# Patient Record
Sex: MALE | Race: WHITE | NOT HISPANIC OR LATINO | Employment: FULL TIME | URBAN - METROPOLITAN AREA
[De-identification: names, ages, dates, MRNs, and addresses within clinical notes are randomized per-mention and may not be internally consistent; named-entity substitution may affect disease eponyms.]

---

## 2018-05-17 ENCOUNTER — OFFICE VISIT (OUTPATIENT)
Dept: FAMILY MEDICINE CLINIC | Facility: CLINIC | Age: 38
End: 2018-05-17

## 2018-05-17 VITALS
BODY MASS INDEX: 32.19 KG/M2 | WEIGHT: 250.8 LBS | RESPIRATION RATE: 16 BRPM | DIASTOLIC BLOOD PRESSURE: 60 MMHG | HEART RATE: 80 BPM | SYSTOLIC BLOOD PRESSURE: 115 MMHG | HEIGHT: 74 IN | OXYGEN SATURATION: 100 % | TEMPERATURE: 97.6 F

## 2018-05-17 DIAGNOSIS — E78.2 MIXED HYPERLIPIDEMIA: Primary | ICD-10-CM

## 2018-05-17 PROCEDURE — 99213 OFFICE O/P EST LOW 20 MIN: CPT | Performed by: PHYSICIAN ASSISTANT

## 2018-05-17 RX ORDER — ROSUVASTATIN CALCIUM 40 MG/1
1 TABLET, COATED ORAL DAILY
COMMUNITY
Start: 2018-03-09 | End: 2021-06-14 | Stop reason: SDUPTHER

## 2018-05-17 RX ORDER — CANAGLIFLOZIN 100 MG/1
1 TABLET, FILM COATED ORAL DAILY
COMMUNITY
Start: 2018-04-26 | End: 2018-11-30 | Stop reason: ALTCHOICE

## 2018-05-17 RX ORDER — GLIPIZIDE 10 MG/1
2 TABLET, FILM COATED, EXTENDED RELEASE ORAL
COMMUNITY
Start: 2018-05-04 | End: 2020-11-02 | Stop reason: SDUPTHER

## 2018-05-17 RX ORDER — SERTRALINE HYDROCHLORIDE 100 MG/1
1 TABLET, FILM COATED ORAL DAILY
COMMUNITY
Start: 2018-05-13 | End: 2020-11-02

## 2018-05-17 RX ORDER — LURASIDONE HYDROCHLORIDE 40 MG/1
1 TABLET, FILM COATED ORAL DAILY
COMMUNITY
Start: 2018-05-13 | End: 2020-11-02

## 2018-05-17 RX ORDER — METFORMIN HYDROCHLORIDE 500 MG/1
2 TABLET, EXTENDED RELEASE ORAL 2 TIMES DAILY
COMMUNITY
Start: 2018-03-09 | End: 2020-11-13 | Stop reason: SDUPTHER

## 2018-05-17 RX ORDER — DULAGLUTIDE 1.5 MG/.5ML
INJECTION, SOLUTION SUBCUTANEOUS
COMMUNITY
Start: 2018-04-30 | End: 2020-10-08 | Stop reason: SDUPTHER

## 2018-05-17 RX ORDER — LISINOPRIL 20 MG/1
1 TABLET ORAL DAILY
COMMUNITY
Start: 2018-04-14 | End: 2020-10-12 | Stop reason: SDUPTHER

## 2018-05-18 NOTE — PROGRESS NOTES
Subjective   George Tolentino is a 38 y.o. male  Establish Care (Referred by Dr. Schneider (endo)) and Diabetes      History of Present Illness   patient is a new patient is a 38-year-old white male comes to get established he has a history of type 2 diabetes and hyperlipidemia comes in with no problems or complaints she's doing better since being seen byendocrinology when you get established with her practice.  Denies any new problems or complaints currently taking them, Glucotrol lisinopril metformin Crestor Zoloft and Trulicity.  Patient states she feels much better but thinks he needs a family doctor  The following portions of the patient's history were reviewed and updated as appropriate: allergies, current medications, past social history and problem list    Review of Systems   Constitutional: Negative for appetite change, diaphoresis, fatigue and unexpected weight change.   Eyes: Negative for visual disturbance.   Respiratory: Negative for cough, chest tightness and shortness of breath.    Cardiovascular: Negative for chest pain, palpitations and leg swelling.   Gastrointestinal: Negative for diarrhea, nausea and vomiting.   Endocrine: Negative for polydipsia, polyphagia and polyuria.   Skin: Negative for color change and rash.   Neurological: Negative for dizziness, syncope, weakness, light-headedness, numbness and headaches.       Objective     Vitals:    05/17/18 1105   BP: 115/60   Pulse: 80   Resp: 16   Temp: 97.6 °F (36.4 °C)   SpO2: 100%       Physical Exam   Constitutional: He appears well-developed and well-nourished.   Neck: Neck supple. No JVD present. No thyromegaly present.   Cardiovascular: Normal rate, regular rhythm, normal heart sounds, intact distal pulses and normal pulses.    No murmur heard.  Pulmonary/Chest: Effort normal and breath sounds normal. No respiratory distress.   Abdominal: Soft. Bowel sounds are normal. There is no hepatosplenomegaly. There is no tenderness.   Musculoskeletal: He  exhibits no edema.   Lymphadenopathy:     He has no cervical adenopathy.   Neurological: No sensory deficit.   Skin: Skin is warm and dry. He is not diaphoretic.   Nursing note and vitals reviewed.      Assessment/Plan     Diagnoses and all orders for this visit:    Mixed hyperlipidemia    Other orders  -     metFORMIN ER (GLUCOPHAGE-XR) 500 MG 24 hr tablet; Take 2 tablets by mouth 2 (Two) Times a Day.  -     glipiZIDE (GLUCOTROL XL) 10 MG 24 hr tablet; Take 2 tablets by mouth. Take two tablets in the morning  -     rosuvastatin (CRESTOR) 40 MG tablet; Take 1 tablet by mouth Daily.  -     LATUDA 40 MG tablet tablet; Take 1 tablet by mouth Daily.  -     INVOKANA 100 MG tablet; Take 1 tablet by mouth Daily.  -     sertraline (ZOLOFT) 100 MG tablet; Take 1 tablet by mouth Daily.  -     TRULICITY 1.5 MG/0.5ML solution pen-injector; Once weekly  -     lisinopril (PRINIVIL,ZESTRIL) 20 MG tablet; Take 1 tablet by mouth Daily.     follow-up in one month discussed with patient continued portions of diet and exercise low carb low calorie diet exercise 3-5 times per week continue appointment with endocrinology we will see him back in summer for full physical

## 2018-11-30 ENCOUNTER — OFFICE VISIT (OUTPATIENT)
Dept: FAMILY MEDICINE CLINIC | Facility: CLINIC | Age: 38
End: 2018-11-30

## 2018-11-30 VITALS
BODY MASS INDEX: 30.96 KG/M2 | DIASTOLIC BLOOD PRESSURE: 70 MMHG | OXYGEN SATURATION: 98 % | WEIGHT: 241.2 LBS | HEIGHT: 74 IN | TEMPERATURE: 98.5 F | HEART RATE: 70 BPM | SYSTOLIC BLOOD PRESSURE: 114 MMHG | RESPIRATION RATE: 14 BRPM

## 2018-11-30 DIAGNOSIS — J30.9 ALLERGIC RHINITIS, UNSPECIFIED SEASONALITY, UNSPECIFIED TRIGGER: Primary | ICD-10-CM

## 2018-11-30 PROCEDURE — 99213 OFFICE O/P EST LOW 20 MIN: CPT | Performed by: PHYSICIAN ASSISTANT

## 2018-11-30 RX ORDER — CANAGLIFLOZIN 300 MG/1
1 TABLET, FILM COATED ORAL DAILY
COMMUNITY
Start: 2018-11-19 | End: 2020-11-02

## 2018-11-30 RX ORDER — AZELASTINE 1 MG/ML
2 SPRAY, METERED NASAL 2 TIMES DAILY
Qty: 39 ML | Refills: 12 | Status: SHIPPED | OUTPATIENT
Start: 2018-11-30 | End: 2021-11-11

## 2018-11-30 NOTE — PROGRESS NOTES
Subjective   George Tolentino is a 38 y.o. male  Allergies (Sneezing, watery/itchy eyes, runny nose, PND (resulting in dry heaves) Treating with OTC meds for several months.)      Allergies   This is a recurrent problem. The current episode started 1 to 4 weeks ago. The problem occurs constantly. The problem has been gradually worsening. Associated symptoms include congestion, coughing, headaches, a sore throat and swollen glands. Pertinent negatives include no chills, fatigue, fever, neck pain, rash, visual change or vomiting. The symptoms are aggravated by coughing, exertion and sneezing. Treatments tried: otc allergy medication. The treatment provided mild relief.       The following portions of the patient's history were reviewed and updated as appropriate: allergies, current medications, past social history and problem list    Review of Systems   Constitutional: Negative for chills, fatigue and fever.   HENT: Positive for congestion, ear pain, postnasal drip, rhinorrhea, sinus pressure and sore throat.    Eyes: Positive for pain.   Respiratory: Positive for cough. Negative for shortness of breath.    Gastrointestinal: Negative for vomiting.   Musculoskeletal: Negative for neck pain.   Skin: Negative for rash.   Neurological: Positive for headaches. Negative for dizziness.   Hematological: Negative for adenopathy.       Objective     Vitals:    11/30/18 0836   BP: 114/70   Pulse: 70   Resp: 14   Temp: 98.5 °F (36.9 °C)   SpO2: 98%       Physical Exam   Constitutional: He appears well-developed and well-nourished. No distress.   HENT:   Head: Normocephalic and atraumatic.   Right Ear: External ear normal.   Left Ear: External ear normal.   Nose: Nose normal.   Mouth/Throat: Oropharynx is clear and moist.   Eyes: Conjunctivae are normal.   Cardiovascular: Normal rate, regular rhythm and normal heart sounds.   Pulmonary/Chest: Effort normal and breath sounds normal.   Skin: He is not diaphoretic.   Nursing note and  vitals reviewed.      Assessment/Plan     Diagnoses and all orders for this visit:    Allergic rhinitis, unspecified seasonality, unspecified trigger  -     azelastine (ASTELIN) 0.1 % nasal spray; 2 sprays into the nostril(s) as directed by provider 2 (Two) Times a Day. Use in each nostril as directed    Other orders  -     INVOKANA 300 MG tablet; Take 1 tablet by mouth Daily.    Depo-Medrol 60 mg IM stat    Follow-up if no better, patient was warned of increased blood sugars due to steroid shot his diet needs to be watched closely since taking steroids.

## 2019-01-25 ENCOUNTER — OFFICE VISIT (OUTPATIENT)
Dept: FAMILY MEDICINE CLINIC | Facility: CLINIC | Age: 39
End: 2019-01-25

## 2019-01-25 VITALS
SYSTOLIC BLOOD PRESSURE: 116 MMHG | BODY MASS INDEX: 30.7 KG/M2 | HEIGHT: 74 IN | DIASTOLIC BLOOD PRESSURE: 80 MMHG | WEIGHT: 239.2 LBS | HEART RATE: 70 BPM | OXYGEN SATURATION: 99 % | RESPIRATION RATE: 16 BRPM

## 2019-01-25 DIAGNOSIS — R11.2 NAUSEA AND VOMITING, INTRACTABILITY OF VOMITING NOT SPECIFIED, UNSPECIFIED VOMITING TYPE: Primary | ICD-10-CM

## 2019-01-25 DIAGNOSIS — M79.671 FOOT PAIN, RIGHT: ICD-10-CM

## 2019-01-25 PROCEDURE — 99214 OFFICE O/P EST MOD 30 MIN: CPT | Performed by: PHYSICIAN ASSISTANT

## 2019-01-25 RX ORDER — PANTOPRAZOLE SODIUM 40 MG/1
40 TABLET, DELAYED RELEASE ORAL DAILY
Qty: 30 TABLET | Refills: 1 | Status: SHIPPED | OUTPATIENT
Start: 2019-01-25 | End: 2021-11-11

## 2019-01-25 NOTE — PROGRESS NOTES
Subjective   George Tolentino is a 38 y.o. male  Vomiting (Five episodes in two weeks. Only occurs in the morning approx one hour after waking up) and Foot Pain (Rt foot for several weeks. No known injury. )      History of Present Illness  Patient pleasant 8-year-old white male comes in complaining of nausea vomiting for last couple of weeks she states when he gets up the morning he still states his stomach he feels like he's having reflux symptoms he vomits first thing in morning that he feels fine this been going on for last 3 weeks he is a diabetic    Patient claims of chronic right foot pain for last couple of weeks patient's pain is worse with sitting standing for long period time when he gets out of bed in the morning pain is worse in his right foot denies any direct injury or trauma to right foot he's been taking Tylenol for pain  The following portions of the patient's history were reviewed and updated as appropriate: allergies, current medications, past social history and problem list    Review of Systems   Constitutional: Negative for appetite change, diaphoresis, fatigue and unexpected weight change.   Eyes: Negative for visual disturbance.   Respiratory: Negative for cough, chest tightness and shortness of breath.    Cardiovascular: Negative for chest pain, palpitations and leg swelling.   Gastrointestinal: Negative for diarrhea, nausea and vomiting.   Endocrine: Negative for polydipsia, polyphagia and polyuria.   Musculoskeletal:        Foot pain   Skin: Negative for color change and rash.   Neurological: Negative for dizziness, syncope, weakness, light-headedness, numbness and headaches.       Objective     Vitals:    01/25/19 0958   BP: 116/80   Pulse: 70   Resp: 16   SpO2: 99%       Physical Exam   Constitutional: He appears well-developed and well-nourished.   Neck: Neck supple. No JVD present. No thyromegaly present.   Cardiovascular: Normal rate, regular rhythm, normal heart sounds, intact distal  pulses and normal pulses.   No murmur heard.  Pulmonary/Chest: Effort normal and breath sounds normal. No respiratory distress.   Abdominal: Soft. Bowel sounds are normal. There is no hepatosplenomegaly. There is no tenderness.   Musculoskeletal: He exhibits no edema.        Lymphadenopathy:     He has no cervical adenopathy.   Neurological: No sensory deficit.   Skin: Skin is warm and dry. He is not diaphoretic.   Nursing note and vitals reviewed.      Assessment/Plan     Diagnoses and all orders for this visit:    Nausea and vomiting, intractability of vomiting not specified, unspecified vomiting type  -     pantoprazole (PROTONIX) 40 MG EC tablet; Take 1 tablet by mouth Daily.    Foot pain, right    Patient advised take over-the-counter Tylenol, range of motion exercise given for right foot, discussed ways to prevent and help plantar fasciitis.    Follow-up in 2 weeks

## 2020-01-14 ENCOUNTER — HOSPITAL ENCOUNTER (EMERGENCY)
Facility: HOSPITAL | Age: 40
Discharge: HOME | End: 2020-01-14
Attending: EMERGENCY MEDICINE | Admitting: EMERGENCY MEDICINE

## 2020-01-14 ENCOUNTER — APPOINTMENT (OUTPATIENT)
Dept: CT IMAGING | Facility: HOSPITAL | Age: 40
End: 2020-01-14

## 2020-01-14 VITALS
HEART RATE: 76 BPM | SYSTOLIC BLOOD PRESSURE: 105 MMHG | WEIGHT: 250 LBS | HEIGHT: 74 IN | RESPIRATION RATE: 18 BRPM | DIASTOLIC BLOOD PRESSURE: 63 MMHG | BODY MASS INDEX: 32.08 KG/M2 | OXYGEN SATURATION: 96 % | TEMPERATURE: 98.5 F

## 2020-01-14 DIAGNOSIS — E86.0 DEHYDRATION: ICD-10-CM

## 2020-01-14 DIAGNOSIS — R11.2 NAUSEA VOMITING AND DIARRHEA: ICD-10-CM

## 2020-01-14 DIAGNOSIS — K52.9 GASTROENTERITIS: Primary | ICD-10-CM

## 2020-01-14 DIAGNOSIS — IMO0001 INSULIN DEPENDENT DIABETES MELLITUS: ICD-10-CM

## 2020-01-14 DIAGNOSIS — R19.7 NAUSEA VOMITING AND DIARRHEA: ICD-10-CM

## 2020-01-14 LAB
ALBUMIN SERPL-MCNC: 4.7 G/DL (ref 3.5–5.2)
ALBUMIN/GLOB SERPL: 1.7 G/DL
ALP SERPL-CCNC: 50 U/L (ref 39–117)
ALT SERPL W P-5'-P-CCNC: 33 U/L (ref 1–41)
ANION GAP SERPL CALCULATED.3IONS-SCNC: 12 MMOL/L (ref 5–15)
AST SERPL-CCNC: 19 U/L (ref 1–40)
B-OH-BUTYR SERPL-SCNC: 0.45 MMOL/L (ref 0.02–0.27)
BACTERIA UR QL AUTO: NORMAL /HPF
BASOPHILS # BLD AUTO: 0.03 10*3/MM3 (ref 0–0.2)
BASOPHILS NFR BLD AUTO: 0.6 % (ref 0–1.5)
BILIRUB SERPL-MCNC: 0.9 MG/DL (ref 0.2–1.2)
BILIRUB UR QL STRIP: NEGATIVE
BUN BLD-MCNC: 21 MG/DL (ref 6–20)
BUN/CREAT SERPL: 30 (ref 7–25)
CALCIUM SPEC-SCNC: 9.5 MG/DL (ref 8.6–10.5)
CHLORIDE SERPL-SCNC: 99 MMOL/L (ref 98–107)
CLARITY UR: CLEAR
CO2 SERPL-SCNC: 26 MMOL/L (ref 22–29)
COLOR UR: YELLOW
CREAT BLD-MCNC: 0.7 MG/DL (ref 0.76–1.27)
DEPRECATED RDW RBC AUTO: 40.3 FL (ref 37–54)
EOSINOPHIL # BLD AUTO: 0.02 10*3/MM3 (ref 0–0.4)
EOSINOPHIL NFR BLD AUTO: 0.4 % (ref 0.3–6.2)
ERYTHROCYTE [DISTWIDTH] IN BLOOD BY AUTOMATED COUNT: 12.3 % (ref 12.3–15.4)
GFR SERPL CREATININE-BSD FRML MDRD: 127 ML/MIN/1.73
GLOBULIN UR ELPH-MCNC: 2.8 GM/DL
GLUCOSE BLD-MCNC: 154 MG/DL (ref 65–99)
GLUCOSE UR STRIP-MCNC: ABNORMAL MG/DL
HCT VFR BLD AUTO: 46.7 % (ref 37.5–51)
HGB BLD-MCNC: 15.9 G/DL (ref 13–17.7)
HGB UR QL STRIP.AUTO: NEGATIVE
HOLD SPECIMEN: NORMAL
HOLD SPECIMEN: NORMAL
HYALINE CASTS UR QL AUTO: NORMAL /LPF
IMM GRANULOCYTES # BLD AUTO: 0.01 10*3/MM3 (ref 0–0.05)
IMM GRANULOCYTES NFR BLD AUTO: 0.2 % (ref 0–0.5)
KETONES UR QL STRIP: ABNORMAL
LEUKOCYTE ESTERASE UR QL STRIP.AUTO: NEGATIVE
LIPASE SERPL-CCNC: 15 U/L (ref 13–60)
LYMPHOCYTES # BLD AUTO: 0.77 10*3/MM3 (ref 0.7–3.1)
LYMPHOCYTES NFR BLD AUTO: 16 % (ref 19.6–45.3)
MCH RBC QN AUTO: 30.6 PG (ref 26.6–33)
MCHC RBC AUTO-ENTMCNC: 34 G/DL (ref 31.5–35.7)
MCV RBC AUTO: 89.8 FL (ref 79–97)
MONOCYTES # BLD AUTO: 0.57 10*3/MM3 (ref 0.1–0.9)
MONOCYTES NFR BLD AUTO: 11.8 % (ref 5–12)
NEUTROPHILS # BLD AUTO: 3.42 10*3/MM3 (ref 1.7–7)
NEUTROPHILS NFR BLD AUTO: 71 % (ref 42.7–76)
NITRITE UR QL STRIP: NEGATIVE
NRBC BLD AUTO-RTO: 0 /100 WBC (ref 0–0.2)
PH UR STRIP.AUTO: 5.5 [PH] (ref 5–8)
PLATELET # BLD AUTO: 162 10*3/MM3 (ref 140–450)
PMV BLD AUTO: 10.2 FL (ref 6–12)
POTASSIUM BLD-SCNC: 3.6 MMOL/L (ref 3.5–5.2)
PROT SERPL-MCNC: 7.5 G/DL (ref 6–8.5)
PROT UR QL STRIP: ABNORMAL
RBC # BLD AUTO: 5.2 10*6/MM3 (ref 4.14–5.8)
RBC # UR: NORMAL /HPF
REF LAB TEST METHOD: NORMAL
SODIUM BLD-SCNC: 137 MMOL/L (ref 136–145)
SP GR UR STRIP: 1.03 (ref 1–1.03)
SQUAMOUS #/AREA URNS HPF: NORMAL /HPF
UROBILINOGEN UR QL STRIP: ABNORMAL
WBC NRBC COR # BLD: 4.82 10*3/MM3 (ref 3.4–10.8)
WBC UR QL AUTO: NORMAL /HPF
WHOLE BLOOD HOLD SPECIMEN: NORMAL
WHOLE BLOOD HOLD SPECIMEN: NORMAL

## 2020-01-14 PROCEDURE — 96374 THER/PROPH/DIAG INJ IV PUSH: CPT

## 2020-01-14 PROCEDURE — 85025 COMPLETE CBC W/AUTO DIFF WBC: CPT | Performed by: EMERGENCY MEDICINE

## 2020-01-14 PROCEDURE — 74177 CT ABD & PELVIS W/CONTRAST: CPT

## 2020-01-14 PROCEDURE — 80053 COMPREHEN METABOLIC PANEL: CPT | Performed by: EMERGENCY MEDICINE

## 2020-01-14 PROCEDURE — 82010 KETONE BODYS QUAN: CPT | Performed by: PHYSICIAN ASSISTANT

## 2020-01-14 PROCEDURE — 81001 URINALYSIS AUTO W/SCOPE: CPT | Performed by: EMERGENCY MEDICINE

## 2020-01-14 PROCEDURE — 25010000002 ONDANSETRON PER 1 MG: Performed by: PHYSICIAN ASSISTANT

## 2020-01-14 PROCEDURE — 96375 TX/PRO/DX INJ NEW DRUG ADDON: CPT

## 2020-01-14 PROCEDURE — 83690 ASSAY OF LIPASE: CPT | Performed by: EMERGENCY MEDICINE

## 2020-01-14 PROCEDURE — 99283 EMERGENCY DEPT VISIT LOW MDM: CPT

## 2020-01-14 PROCEDURE — 96361 HYDRATE IV INFUSION ADD-ON: CPT

## 2020-01-14 PROCEDURE — 25010000002 IOPAMIDOL 61 % SOLUTION: Performed by: EMERGENCY MEDICINE

## 2020-01-14 RX ORDER — ONDANSETRON 2 MG/ML
4 INJECTION INTRAMUSCULAR; INTRAVENOUS ONCE
Status: COMPLETED | OUTPATIENT
Start: 2020-01-14 | End: 2020-01-14

## 2020-01-14 RX ORDER — FAMOTIDINE 10 MG/ML
20 INJECTION, SOLUTION INTRAVENOUS ONCE
Status: COMPLETED | OUTPATIENT
Start: 2020-01-14 | End: 2020-01-14

## 2020-01-14 RX ORDER — SODIUM CHLORIDE 0.9 % (FLUSH) 0.9 %
10 SYRINGE (ML) INJECTION AS NEEDED
Status: DISCONTINUED | OUTPATIENT
Start: 2020-01-14 | End: 2020-01-14 | Stop reason: HOSPADM

## 2020-01-14 RX ORDER — ONDANSETRON 4 MG/1
4 TABLET, ORALLY DISINTEGRATING ORAL EVERY 8 HOURS PRN
Qty: 30 TABLET | Refills: 0 | Status: SHIPPED | OUTPATIENT
Start: 2020-01-14 | End: 2021-11-11

## 2020-01-14 RX ADMIN — ONDANSETRON 4 MG: 2 INJECTION INTRAMUSCULAR; INTRAVENOUS at 11:55

## 2020-01-14 RX ADMIN — IOPAMIDOL 100 ML: 612 INJECTION, SOLUTION INTRAVENOUS at 12:55

## 2020-01-14 RX ADMIN — FAMOTIDINE 20 MG: 10 INJECTION, SOLUTION INTRAVENOUS at 11:55

## 2020-01-14 RX ADMIN — SODIUM CHLORIDE, POTASSIUM CHLORIDE, SODIUM LACTATE AND CALCIUM CHLORIDE 2466 ML: 600; 310; 30; 20 INJECTION, SOLUTION INTRAVENOUS at 11:53

## 2020-01-14 NOTE — ED PROVIDER NOTES
Subjective   39-year-old male with a history of type 2 insulin-dependent diabetes presents emergency department with a 2-day history of intractable nausea vomiting and diarrhea.  Vomiting TNTC, as well as watery diarrhea TNTC with mild stomach cramping, orthostasis fatigue anorexia and occasional chills and sweats.  He denies any recent ill contacts, no suspicious food ingestion, no recent antibiotics.  No constitutional symptoms such as documented fevers, myalgias or arthralgias.  No stiff neck vision changes or photophobia.  No sore throat rhinorrhea or cough.  He is an insulin-dependent type 2 diabetic, states his blood glucose levels have been slightly higher than usual.  Past medical history is further remarkable for GERD and hypertension as well as depression.  Medication list reviewed, he denies drug allergies.          Review of Systems   Constitutional: Positive for activity change and appetite change. Negative for chills, diaphoresis and fever.   HENT: Negative for congestion and sore throat.    Eyes: Negative for photophobia and visual disturbance.   Respiratory: Negative for cough, choking, chest tightness, shortness of breath and wheezing.    Cardiovascular: Negative for chest pain and leg swelling.   Gastrointestinal: Positive for diarrhea, nausea and vomiting. Negative for abdominal distention, abdominal pain, anal bleeding, blood in stool and constipation.   Genitourinary: Negative for difficulty urinating, dysuria, flank pain, frequency, hematuria and urgency.   Musculoskeletal: Negative for arthralgias, back pain, joint swelling and myalgias.   Neurological: Negative for dizziness, seizures, syncope, speech difficulty, weakness, light-headedness, numbness and headaches.   Psychiatric/Behavioral: Negative for confusion.   All other systems reviewed and are negative.      Past Medical History:   Diagnosis Date   • Depression    • Diabetes mellitus (CMS/[facility])    • GERD (gastroesophageal reflux disease)     • Hypertension        No Known Allergies    History reviewed. No pertinent surgical history.    Family History   Problem Relation Age of Onset   • Thyroid disease Mother    • Diabetes Father    • Hypertension Father    • Hyperlipidemia Father    • Liver disease Father    • Diabetes Maternal Grandmother    • Diabetes Maternal Grandfather    • Diabetes Paternal Grandmother    • Thyroid disease Paternal Grandmother    • Kidney disease Paternal Grandmother    • Cancer Paternal Grandfather    • Diabetes Paternal Grandfather    • Heart attack Paternal Grandfather    • Kidney disease Paternal Grandfather        Social History     Socioeconomic History   • Marital status:      Spouse name: Not on file   • Number of children: Not on file   • Years of education: Not on file   • Highest education level: Not on file   Tobacco Use   • Smoking status: Never Smoker   • Smokeless tobacco: Never Used   Substance and Sexual Activity   • Alcohol use: Yes     Comment: 10 drinks/week   • Drug use: No     Comment: Quit 5/16/18           Objective   Physical Exam   Constitutional: He is oriented to person, place, and time. He appears well-developed and well-nourished. No distress.   Afebrile awake alert and oriented, hemodynamically stable, not toxic appearing.   HENT:   Head: Normocephalic and atraumatic.   Right Ear: External ear normal.   Left Ear: External ear normal.   Nose: Nose normal.   Mouth/Throat: Oropharynx is clear and moist. No oropharyngeal exudate.   Eyes: Pupils are equal, round, and reactive to light. Conjunctivae and EOM are normal. Right eye exhibits no discharge. Left eye exhibits no discharge. No scleral icterus.   Neck: Normal range of motion. Neck supple. No JVD present. No tracheal deviation present. No thyromegaly present.   Cardiovascular: Normal rate, regular rhythm and normal heart sounds. Exam reveals no gallop and no friction rub.   No murmur heard.  Pulmonary/Chest: Effort normal and breath sounds  normal. No stridor. No respiratory distress. He has no wheezes. He exhibits no tenderness.   Abdominal: Soft. Bowel sounds are normal. He exhibits no distension and no mass. There is no tenderness. There is no rebound and no guarding.   Musculoskeletal: Normal range of motion. He exhibits no edema, tenderness or deformity.   Neurological: He is alert and oriented to person, place, and time. No cranial nerve deficit. He exhibits normal muscle tone. Coordination normal.   Skin: Skin is warm and dry. No rash noted. He is not diaphoretic. No erythema. No pallor.   Psychiatric: He has a normal mood and affect. His behavior is normal. Judgment and thought content normal.   Nursing note and vitals reviewed.      Procedures           ED Course  ED Course as of Jan 14 1654   Tue Jan 14, 2020   1110 Anion Gap: 12.0 [TG]   1110 BUN(!): 21 [TG]   1110 Creatinine(!): 0.70 [TG]   1110 Glucose(!): 154 [TG]   1111 Specific Gravity, UA(!): 1.034 [TG]   1111 Ketones, UA(!): 15 mg/dL (1+) [TG]   1111 WBC: 4.82 [TG]   1137 Beta-Hydroxybutyrate Quant(!): 0.449 [TG]      ED Course User Index  [TG] Qasim Ott PA-C                      [place] Coma Scale Score: 15                Recent Results (from the past 24 hour(s))   Comprehensive Metabolic Panel    Collection Time: 01/14/20 10:10 AM   Result Value Ref Range    Glucose 154 (H) 65 - 99 mg/dL    BUN 21 (H) 6 - 20 mg/dL    Creatinine 0.70 (L) 0.76 - 1.27 mg/dL    Sodium 137 136 - 145 mmol/L    Potassium 3.6 3.5 - 5.2 mmol/L    Chloride 99 98 - 107 mmol/L    CO2 26.0 22.0 - 29.0 mmol/L    Calcium 9.5 8.6 - 10.5 mg/dL    Total Protein 7.5 6.0 - 8.5 g/dL    Albumin 4.70 3.50 - 5.20 g/dL    ALT (SGPT) 33 1 - 41 U/L    AST (SGOT) 19 1 - 40 U/L    Alkaline Phosphatase 50 39 - 117 U/L    Total Bilirubin 0.9 0.2 - 1.2 mg/dL    eGFR Non African Amer 127 >60 mL/min/1.73    Globulin 2.8 gm/dL    A/G Ratio 1.7 g/dL    BUN/Creatinine Ratio 30.0 (H) 7.0 - 25.0    Anion Gap 12.0 5.0 - 15.0  mmol/L   Lipase    Collection Time: 01/14/20 10:10 AM   Result Value Ref Range    Lipase 15 13 - 60 U/L   Urinalysis With Microscopic If Indicated (No Culture) - Urine, Clean Catch    Collection Time: 01/14/20 10:10 AM   Result Value Ref Range    Color, UA Yellow Yellow, Straw    Appearance, UA Clear Clear    pH, UA 5.5 5.0 - 8.0    Specific Gravity, UA 1.034 (H) 1.001 - 1.030    Glucose, UA >=1000 mg/dL (3+) (A) Negative    Ketones, UA 15 mg/dL (1+) (A) Negative    Bilirubin, UA Negative Negative    Blood, UA Negative Negative    Protein,  mg/dL (2+) (A) Negative    Leuk Esterase, UA Negative Negative    Nitrite, UA Negative Negative    Urobilinogen, UA 1.0 E.U./dL 0.2 - 1.0 E.U./dL   Light Blue Top    Collection Time: 01/14/20 10:10 AM   Result Value Ref Range    Extra Tube hold for add-on    Green Top (Gel)    Collection Time: 01/14/20 10:10 AM   Result Value Ref Range    Extra Tube Hold for add-ons.    Lavender Top    Collection Time: 01/14/20 10:10 AM   Result Value Ref Range    Extra Tube hold for add-on    Gold Top - SST    Collection Time: 01/14/20 10:10 AM   Result Value Ref Range    Extra Tube Hold for add-ons.    CBC Auto Differential    Collection Time: 01/14/20 10:10 AM   Result Value Ref Range    WBC 4.82 3.40 - 10.80 10*3/mm3    RBC 5.20 4.14 - 5.80 10*6/mm3    Hemoglobin 15.9 13.0 - 17.7 g/dL    Hematocrit 46.7 37.5 - 51.0 %    MCV 89.8 79.0 - 97.0 fL    MCH 30.6 26.6 - 33.0 pg    MCHC 34.0 31.5 - 35.7 g/dL    RDW 12.3 12.3 - 15.4 %    RDW-SD 40.3 37.0 - 54.0 fl    MPV 10.2 6.0 - 12.0 fL    Platelets 162 140 - 450 10*3/mm3    Neutrophil % 71.0 42.7 - 76.0 %    Lymphocyte % 16.0 (L) 19.6 - 45.3 %    Monocyte % 11.8 5.0 - 12.0 %    Eosinophil % 0.4 0.3 - 6.2 %    Basophil % 0.6 0.0 - 1.5 %    Immature Grans % 0.2 0.0 - 0.5 %    Neutrophils, Absolute 3.42 1.70 - 7.00 10*3/mm3    Lymphocytes, Absolute 0.77 0.70 - 3.10 10*3/mm3    Monocytes, Absolute 0.57 0.10 - 0.90 10*3/mm3    Eosinophils,  Absolute 0.02 0.00 - 0.40 10*3/mm3    Basophils, Absolute 0.03 0.00 - 0.20 10*3/mm3    Immature Grans, Absolute 0.01 0.00 - 0.05 10*3/mm3    nRBC 0.0 0.0 - 0.2 /100 WBC   Urinalysis, Microscopic Only - Urine, Clean Catch    Collection Time: 01/14/20 10:10 AM   Result Value Ref Range    RBC, UA None Seen None Seen, 0-2 /HPF    WBC, UA 0-2 None Seen, 0-2 /HPF    Bacteria, UA None Seen None Seen, Trace /HPF    Squamous Epithelial Cells, UA 0-2 None Seen, 0-2 /HPF    Hyaline Casts, UA 0-6 0 - 6 /LPF    Methodology Automated Microscopy    Beta Hydroxybutyrate Quantitative    Collection Time: 01/14/20 10:10 AM   Result Value Ref Range    Beta-Hydroxybutyrate Quant 0.449 (H) 0.020 - 0.270 mmol/L     Note: In addition to lab results from this visit, the labs listed above may include labs taken at another facility or during a different encounter within the last 24 hours. Please correlate lab times with ED admission and discharge times for further clarification of the services performed during this visit.    CT Abdomen Pelvis With Contrast   Preliminary Result   1. Small bowel wall thickening and trace abdominal free fluid noted   favored to represent an enteritis. Correlate clinically.   2. Hepatic steatosis.       D:  01/14/2020   E:  01/14/2020            Vitals:    01/14/20 1315 01/14/20 1330 01/14/20 1345 01/14/20 1400   BP:    105/63   BP Location:       Patient Position:       Pulse:       Resp:       Temp:       TempSrc:       SpO2: 98% 97% 96% 96%   Weight:       Height:         Medications   sodium chloride 0.9 % flush 10 mL (has no administration in time range)   lactated ringers - IBW for BMI > 30 bolus 2,466 mL (2,466 mL Intravenous New Bag 1/14/20 1153)   ondansetron (ZOFRAN) injection 4 mg (4 mg Intravenous Given 1/14/20 1155)   famotidine (PEPCID) injection 20 mg (20 mg Intravenous Given 1/14/20 1155)   iopamidol (ISOVUE-300) 61 % injection 100 mL (100 mL Intravenous Given 1/14/20 1255)     ECG/EMG Results  (last 24 hours)     ** No results found for the last 24 hours. **        No orders to display               MDM    Final diagnoses:   Gastroenteritis   Nausea vomiting and diarrhea   Dehydration   Insulin dependent diabetes mellitus (CMS/[facility])            Qasim Ott PA-C  01/14/20 4149

## 2020-01-14 NOTE — DISCHARGE INSTRUCTIONS
Clear liquids only for the next 12 hours, then brat diet (broth, rice, applesauce, toast) for 24 hours.  No dairy or fried foods for 3 to 5 days.  Follow-up with your primary care provider in 2 to 3 days for recheck.  Return to the emergency department immediately if any change or worsening of symptoms.

## 2020-10-09 RX ORDER — DULAGLUTIDE 1.5 MG/.5ML
1.5 INJECTION, SOLUTION SUBCUTANEOUS WEEKLY
Qty: 12 PEN | Refills: 3 | Status: SHIPPED | OUTPATIENT
Start: 2020-10-09 | End: 2021-10-19

## 2020-10-12 RX ORDER — LISINOPRIL 20 MG/1
TABLET ORAL
Qty: 60 TABLET | Refills: 5 | Status: SHIPPED | OUTPATIENT
Start: 2020-10-12 | End: 2021-01-08 | Stop reason: SDUPTHER

## 2020-10-13 ENCOUNTER — PRIOR AUTHORIZATION (OUTPATIENT)
Dept: ENDOCRINOLOGY | Facility: CLINIC | Age: 40
End: 2020-10-13

## 2020-10-14 ENCOUNTER — TELEPHONE (OUTPATIENT)
Dept: ENDOCRINOLOGY | Facility: CLINIC | Age: 40
End: 2020-10-14

## 2020-11-02 ENCOUNTER — LAB (OUTPATIENT)
Dept: LAB | Facility: HOSPITAL | Age: 40
End: 2020-11-02

## 2020-11-02 ENCOUNTER — OFFICE VISIT (OUTPATIENT)
Dept: ENDOCRINOLOGY | Facility: CLINIC | Age: 40
End: 2020-11-02

## 2020-11-02 VITALS
TEMPERATURE: 97.8 F | WEIGHT: 254.8 LBS | HEIGHT: 74 IN | OXYGEN SATURATION: 98 % | DIASTOLIC BLOOD PRESSURE: 68 MMHG | SYSTOLIC BLOOD PRESSURE: 112 MMHG | BODY MASS INDEX: 32.7 KG/M2 | HEART RATE: 92 BPM

## 2020-11-02 DIAGNOSIS — R80.9 TYPE 2 DIABETES MELLITUS WITH MICROALBUMINURIA, WITHOUT LONG-TERM CURRENT USE OF INSULIN (HCC): ICD-10-CM

## 2020-11-02 DIAGNOSIS — E78.2 MIXED HYPERLIPIDEMIA: ICD-10-CM

## 2020-11-02 DIAGNOSIS — E11.65 UNCONTROLLED TYPE 2 DIABETES MELLITUS WITH HYPERGLYCEMIA (HCC): ICD-10-CM

## 2020-11-02 DIAGNOSIS — E11.29 TYPE 2 DIABETES MELLITUS WITH MICROALBUMINURIA, WITHOUT LONG-TERM CURRENT USE OF INSULIN (HCC): ICD-10-CM

## 2020-11-02 DIAGNOSIS — E11.65 UNCONTROLLED TYPE 2 DIABETES MELLITUS WITH HYPERGLYCEMIA (HCC): Primary | ICD-10-CM

## 2020-11-02 LAB
ALBUMIN SERPL-MCNC: 4.5 G/DL (ref 3.5–5.2)
ALBUMIN UR-MCNC: 38.4 MG/DL
ALBUMIN/GLOB SERPL: 1.8 G/DL
ALP SERPL-CCNC: 60 U/L (ref 39–117)
ALT SERPL W P-5'-P-CCNC: 56 U/L (ref 1–41)
ANION GAP SERPL CALCULATED.3IONS-SCNC: 8 MMOL/L (ref 5–15)
AST SERPL-CCNC: 29 U/L (ref 1–40)
BILIRUB SERPL-MCNC: 0.6 MG/DL (ref 0–1.2)
BUN SERPL-MCNC: 15 MG/DL (ref 6–20)
BUN/CREAT SERPL: 23.4 (ref 7–25)
CALCIUM SPEC-SCNC: 9.4 MG/DL (ref 8.6–10.5)
CHLORIDE SERPL-SCNC: 102 MMOL/L (ref 98–107)
CHOLEST SERPL-MCNC: 115 MG/DL (ref 0–200)
CO2 SERPL-SCNC: 27 MMOL/L (ref 22–29)
CREAT SERPL-MCNC: 0.64 MG/DL (ref 0.76–1.27)
CREAT UR-MCNC: 61.6 MG/DL
EXPIRATION DATE: NORMAL
GFR SERPL CREATININE-BSD FRML MDRD: 140 ML/MIN/1.73
GLOBULIN UR ELPH-MCNC: 2.5 GM/DL
GLUCOSE SERPL-MCNC: 236 MG/DL (ref 65–99)
HBA1C MFR BLD: 8.8 %
HDLC SERPL-MCNC: 40 MG/DL (ref 40–60)
LDLC SERPL CALC-MCNC: 50 MG/DL (ref 0–100)
LDLC/HDLC SERPL: 1.15 {RATIO}
Lab: NORMAL
MICROALBUMIN/CREAT UR: 623.4 MG/G
POTASSIUM SERPL-SCNC: 4.4 MMOL/L (ref 3.5–5.2)
PROT SERPL-MCNC: 7 G/DL (ref 6–8.5)
SODIUM SERPL-SCNC: 137 MMOL/L (ref 136–145)
TRIGL SERPL-MCNC: 146 MG/DL (ref 0–150)
TSH SERPL DL<=0.05 MIU/L-ACNC: 0.95 UIU/ML (ref 0.27–4.2)
VLDLC SERPL-MCNC: 25 MG/DL (ref 5–40)

## 2020-11-02 PROCEDURE — 84443 ASSAY THYROID STIM HORMONE: CPT | Performed by: INTERNAL MEDICINE

## 2020-11-02 PROCEDURE — 82570 ASSAY OF URINE CREATININE: CPT | Performed by: INTERNAL MEDICINE

## 2020-11-02 PROCEDURE — 80061 LIPID PANEL: CPT | Performed by: INTERNAL MEDICINE

## 2020-11-02 PROCEDURE — 80053 COMPREHEN METABOLIC PANEL: CPT | Performed by: INTERNAL MEDICINE

## 2020-11-02 PROCEDURE — 99214 OFFICE O/P EST MOD 30 MIN: CPT | Performed by: INTERNAL MEDICINE

## 2020-11-02 PROCEDURE — 83036 HEMOGLOBIN GLYCOSYLATED A1C: CPT | Performed by: INTERNAL MEDICINE

## 2020-11-02 PROCEDURE — 82043 UR ALBUMIN QUANTITATIVE: CPT | Performed by: INTERNAL MEDICINE

## 2020-11-02 RX ORDER — GLIPIZIDE 10 MG/1
TABLET, FILM COATED, EXTENDED RELEASE ORAL
Qty: 60 TABLET | Refills: 2 | Status: SHIPPED | OUTPATIENT
Start: 2020-11-02 | End: 2021-04-15

## 2020-11-02 NOTE — ASSESSMENT & PLAN NOTE
Diabetes is worsening.   Continue current treatment regimen.  Diabetes will be reassessed in 3 months.    A1c above goal.  He just resumed trulicity 3 weeks ago.  Continue current meds.  Work on diet/exercise.

## 2020-11-02 NOTE — PROGRESS NOTES
"     Office Note      Date: 2020  Patient Name: George Tolentino  MRN: [id number]  : 1980    Chief Complaint   Patient presents with   • Follow-up   • Diabetes       History of Present Illness:   George Tolentino is a 40 y.o. male who presents for Diabetes type 2. Diagnosed in: . Treated in past with oral agents, insulin and trulicity. Current treatments: glipizide, metformin and trulicity.  He just started back on trulicity about 3 weeks ago. Number of insulin shots per day: none. Checks blood sugar 1 times a day. Has low blood sugar: no. Aspirin use: No - no indication. Statin use: Yes. ACE-I/ARB use: Yes. Changes in health since last visit: none. Last eye exam .    Subjective      Diabetic Complications:  Eyes: No  Kidneys: Yes - nephropathy  Feet: No  Heart: No    Diet and Exercise:  Meals per day: 3  Minutes of exercise per week: 0 mins.    Review of Systems:   Review of Systems   Constitutional: Negative.    Cardiovascular: Negative.    Gastrointestinal: Positive for diarrhea and nausea.   Endocrine: Negative.        The following portions of the patient's history were reviewed and updated as appropriate: allergies, current medications, past family history, past medical history, past social history, past surgical history and problem list.    Objective       Visit Vitals  /68   Pulse 92   Temp 97.8 °F (36.6 °C) (Infrared)   Ht 188 cm (74\")   Wt 116 kg (254 lb 12.8 oz)   SpO2 98%   BMI 32.71 kg/m²       Physical Exam:  Physical Exam  Constitutional:       Appearance: Normal appearance.   Cardiovascular:      Pulses:           Dorsalis pedis pulses are 2+ on the right side and 2+ on the left side.        Posterior tibial pulses are 2+ on the right side and 2+ on the left side.   Feet:      Right foot:      Protective Sensation: 5 sites tested. 5 sites sensed.      Skin integrity: Skin integrity normal.      Toenail Condition: Right toenails are normal.      Left foot:      Protective " Sensation: 5 sites tested. 5 sites sensed.      Skin integrity: Skin integrity normal.      Toenail Condition: Left toenails are normal.   Neurological:      Mental Status: He is alert.         Labs:    HbA1c  Lab Results   Component Value Date    HGBA1C 8.8 11/02/2020       CMP  Lab Results   Component Value Date    GLUCOSE 154 (H) 01/14/2020    BUN 21 (H) 01/14/2020    CREATININE 0.70 (L) 01/14/2020    EGFRIFNONA 127 01/14/2020    BCR 30.0 (H) 01/14/2020    K 3.6 01/14/2020    CO2 26.0 01/14/2020    CALCIUM 9.5 01/14/2020    AST 19 01/14/2020    ALT 33 01/14/2020        Lipid Panel        TSH  No results found for: TSH, FREET4     Hemoglobin A1C  Lab Results   Component Value Date    HGBA1C 8.8 11/02/2020        Microalbumin/Creatinine  No results found for: MALBCRERATIO, CREATINIURIN, MICROALBUR        Assessment / Plan      Assessment & Plan:  Problem List Items Addressed This Visit        Cardiovascular and Mediastinum    Mixed hyperlipidemia    Current Assessment & Plan     Check lipids today.         Relevant Medications    rosuvastatin (CRESTOR) 40 MG tablet    Other Relevant Orders    Lipid Panel       Endocrine    Uncontrolled type 2 diabetes mellitus with hyperglycemia (CMS/[facility]) - Primary    Current Assessment & Plan     Diabetes is worsening.   Continue current treatment regimen.  Diabetes will be reassessed in 3 months.    A1c above goal.  He just resumed trulicity 3 weeks ago.  Continue current meds.  Work on diet/exercise.         Relevant Medications    metFORMIN ER (GLUCOPHAGE-XR) 500 MG 24 hr tablet    glipiZIDE (GLUCOTROL XL) 10 MG 24 hr tablet    Trulicity 1.5 MG/0.5ML solution pen-injector    Other Relevant Orders    POC Glycosylated Hemoglobin (Hb A1C) (Completed)    Comprehensive Metabolic Panel    TSH    Type 2 diabetes mellitus with microalbuminuria, without long-term current use of insulin (CMS/[facility])    Current Assessment & Plan     Check microalbumin today.  On ACE-I.         Relevant  Medications    metFORMIN ER (GLUCOPHAGE-XR) 500 MG 24 hr tablet    glipiZIDE (GLUCOTROL XL) 10 MG 24 hr tablet    Trulicity 1.5 MG/0.5ML solution pen-injector    Other Relevant Orders    Microalbumin / Creatinine Urine Ratio - Urine, Clean Catch           Return in about 3 months (around 2/2/2021) for Recheck with A1c.    Franco Schneider MD   11/02/2020

## 2020-11-13 RX ORDER — METFORMIN HYDROCHLORIDE 500 MG/1
1000 TABLET, EXTENDED RELEASE ORAL 2 TIMES DAILY
Qty: 180 TABLET | Refills: 3 | Status: SHIPPED | OUTPATIENT
Start: 2020-11-13 | End: 2021-06-04

## 2020-11-13 NOTE — TELEPHONE ENCOUNTER
Pt has been out for 1 week he needs metformin called [facility]     Pt number [phone]

## 2020-11-19 ENCOUNTER — TELEPHONE (OUTPATIENT)
Dept: ENDOCRINOLOGY | Facility: CLINIC | Age: 40
End: 2020-11-19

## 2021-01-08 RX ORDER — LISINOPRIL 20 MG/1
TABLET ORAL
Qty: 60 TABLET | Refills: 5 | Status: SHIPPED | OUTPATIENT
Start: 2021-01-08 | End: 2021-10-18

## 2021-02-15 ENCOUNTER — OFFICE VISIT (OUTPATIENT)
Dept: ENDOCRINOLOGY | Facility: CLINIC | Age: 41
End: 2021-02-15

## 2021-02-15 VITALS
WEIGHT: 256 LBS | DIASTOLIC BLOOD PRESSURE: 84 MMHG | HEIGHT: 74 IN | BODY MASS INDEX: 32.85 KG/M2 | TEMPERATURE: 97.3 F | HEART RATE: 84 BPM | SYSTOLIC BLOOD PRESSURE: 136 MMHG | OXYGEN SATURATION: 96 %

## 2021-02-15 DIAGNOSIS — E11.29 TYPE 2 DIABETES MELLITUS WITH MICROALBUMINURIA, WITHOUT LONG-TERM CURRENT USE OF INSULIN (HCC): ICD-10-CM

## 2021-02-15 DIAGNOSIS — E11.65 UNCONTROLLED TYPE 2 DIABETES MELLITUS WITH HYPERGLYCEMIA (HCC): Primary | ICD-10-CM

## 2021-02-15 DIAGNOSIS — R80.9 TYPE 2 DIABETES MELLITUS WITH MICROALBUMINURIA, WITHOUT LONG-TERM CURRENT USE OF INSULIN (HCC): ICD-10-CM

## 2021-02-15 DIAGNOSIS — E78.2 MIXED HYPERLIPIDEMIA: ICD-10-CM

## 2021-02-15 LAB
EXPIRATION DATE: NORMAL
HBA1C MFR BLD: 8.7 %
Lab: NORMAL

## 2021-02-15 PROCEDURE — 83036 HEMOGLOBIN GLYCOSYLATED A1C: CPT | Performed by: INTERNAL MEDICINE

## 2021-02-15 PROCEDURE — 99214 OFFICE O/P EST MOD 30 MIN: CPT | Performed by: INTERNAL MEDICINE

## 2021-02-15 NOTE — ASSESSMENT & PLAN NOTE
Diabetes is unchanged.   Continue current treatment regimen.  Having some nausea with trulicity so I don't want to increase dose yet.  I would like to add SGLT-2 inhibitor due to uncontrolled DM and albuminuria.  Potential s/e discussed.    Diabetes will be reassessed in 3 months.

## 2021-02-15 NOTE — PROGRESS NOTES
"     Office Note      Date: 02/15/2021  Patient Name: George Tolentino  MRN: [id number]  : 1980    Chief Complaint   Patient presents with   • Diabetes       History of Present Illness:   George Tolentino is a 40 y.o. male who presents for Diabetes type 2. Diagnosed in: . Treated in past with oral agents, insulin and trulicity. Current treatments: glipizide, metformin and trulicity.  Number of insulin shots per day: none. Checks blood sugar 1 times a day. Has low blood sugar: no. Aspirin use: No - no indication. Statin use: Yes. ACE-I/ARB use: Yes. Changes in health since last visit: none. Last eye exam .    Subjective      Diabetic Complications:  Eyes: No  Kidneys: No  Feet: Yes - nephropathy  Heart: No    Diet and Exercise:  Meals per day: 3  Minutes of exercise per week: 0 mins.    Review of Systems:   Review of Systems   Constitutional: Negative.    Cardiovascular: Negative.    Gastrointestinal: Negative.    Endocrine: Negative.        The following portions of the patient's history were reviewed and updated as appropriate: allergies, current medications, past family history, past medical history, past social history, past surgical history and problem list.    Objective       Visit Vitals  /84 (BP Location: Left arm, Patient Position: Sitting, Cuff Size: Adult)   Pulse 84   Temp 97.3 °F (36.3 °C) (Infrared)   Ht 188 cm (74\")   Wt 116 kg (256 lb)   SpO2 96%   BMI 32.87 kg/m²       Physical Exam:  Physical Exam  Constitutional:       Appearance: Normal appearance.   Neurological:      Mental Status: He is alert.         Labs:    HbA1c  Lab Results   Component Value Date    HGBA1C 8.7 02/15/2021       CMP  Lab Results   Component Value Date    GLUCOSE 236 (H) 2020    BUN 15 2020    CREATININE 0.64 (L) 2020    EGFRIFNONA 140 2020    BCR 23.4 2020    K 4.4 2020    CO2 27.0 2020    CALCIUM 9.4 2020    AST 29 2020    ALT 56 (H) 2020    "     Lipid Panel  Lab Results   Component Value Date    HDL 40 11/02/2020    LDL 50 11/02/2020    TRIG 146 11/02/2020        TSH  Lab Results   Component Value Date    TSH 0.946 11/02/2020        Hemoglobin A1C  Lab Results   Component Value Date    HGBA1C 8.7 02/15/2021        Microalbumin/Creatinine  Lab Results   Component Value Date    NELLIERERATIO 623.4 11/02/2020    MICROALBUR 38.4 11/02/2020           Assessment / Plan      Assessment & Plan:  Diagnoses and all orders for this visit:    1. Uncontrolled type 2 diabetes mellitus with hyperglycemia (CMS/[facility]) (Primary)  Assessment & Plan:  Diabetes is unchanged.   Continue current treatment regimen.  Having some nausea with trulicity so I don't want to increase dose yet.  I would like to add SGLT-2 inhibitor due to uncontrolled DM and albuminuria.  Potential s/e discussed.    Diabetes will be reassessed in 3 months.      2. Type 2 diabetes mellitus with microalbuminuria, without long-term current use of insulin (CMS/[facility])  Assessment & Plan:  Continue ACE-I.  Check microalbumin next visit.  We discussed SGLT-2 inhibitor.    Orders:  -     POC Glycosylated Hemoglobin (Hb A1C)    3. Mixed hyperlipidemia  Assessment & Plan:  Continue statin.  Lipids last visit were okay.      Other orders  -     dapagliflozin (FARXIGA) 5 MG tablet tablet; Take 1 tablet by mouth Daily.  Dispense: 30 tablet; Refill: 5      Return in about 3 months (around 5/15/2021) for Recheck with A1c, microalbumin.    Franco Schneider MD   02/15/2021

## 2021-04-15 RX ORDER — GLIPIZIDE 10 MG/1
TABLET, FILM COATED, EXTENDED RELEASE ORAL
Qty: 60 TABLET | Refills: 1 | Status: SHIPPED | OUTPATIENT
Start: 2021-04-15 | End: 2021-04-19 | Stop reason: SDUPTHER

## 2021-04-19 RX ORDER — GLIPIZIDE 10 MG/1
TABLET, FILM COATED, EXTENDED RELEASE ORAL
Qty: 60 TABLET | Refills: 5 | Status: SHIPPED | OUTPATIENT
Start: 2021-04-19 | End: 2021-06-14 | Stop reason: SDUPTHER

## 2021-06-04 RX ORDER — METFORMIN HYDROCHLORIDE 500 MG/1
TABLET, EXTENDED RELEASE ORAL
Qty: 180 TABLET | Refills: 0 | Status: SHIPPED | OUTPATIENT
Start: 2021-06-04 | End: 2021-07-14

## 2021-06-14 ENCOUNTER — LAB (OUTPATIENT)
Dept: LAB | Facility: HOSPITAL | Age: 41
End: 2021-06-14

## 2021-06-14 ENCOUNTER — OFFICE VISIT (OUTPATIENT)
Dept: ENDOCRINOLOGY | Facility: CLINIC | Age: 41
End: 2021-06-14

## 2021-06-14 VITALS
HEART RATE: 75 BPM | SYSTOLIC BLOOD PRESSURE: 138 MMHG | OXYGEN SATURATION: 98 % | BODY MASS INDEX: 32.11 KG/M2 | HEIGHT: 74 IN | WEIGHT: 250.2 LBS | DIASTOLIC BLOOD PRESSURE: 82 MMHG

## 2021-06-14 DIAGNOSIS — E11.29 TYPE 2 DIABETES MELLITUS WITH MICROALBUMINURIA, WITHOUT LONG-TERM CURRENT USE OF INSULIN (HCC): ICD-10-CM

## 2021-06-14 DIAGNOSIS — E11.65 UNCONTROLLED TYPE 2 DIABETES MELLITUS WITH HYPERGLYCEMIA (HCC): Primary | ICD-10-CM

## 2021-06-14 DIAGNOSIS — R80.9 TYPE 2 DIABETES MELLITUS WITH MICROALBUMINURIA, WITHOUT LONG-TERM CURRENT USE OF INSULIN (HCC): ICD-10-CM

## 2021-06-14 DIAGNOSIS — E78.2 MIXED HYPERLIPIDEMIA: ICD-10-CM

## 2021-06-14 LAB
ALBUMIN UR-MCNC: 29 MG/DL
CREAT UR-MCNC: 59.3 MG/DL
EXPIRATION DATE: ABNORMAL
EXPIRATION DATE: NORMAL
GLUCOSE BLDC GLUCOMTR-MCNC: 196 MG/DL (ref 70–130)
HBA1C MFR BLD: 7.8 %
Lab: ABNORMAL
Lab: NORMAL
MICROALBUMIN/CREAT UR: 489 MG/G

## 2021-06-14 PROCEDURE — 82947 ASSAY GLUCOSE BLOOD QUANT: CPT | Performed by: INTERNAL MEDICINE

## 2021-06-14 PROCEDURE — 82570 ASSAY OF URINE CREATININE: CPT

## 2021-06-14 PROCEDURE — 83036 HEMOGLOBIN GLYCOSYLATED A1C: CPT | Performed by: INTERNAL MEDICINE

## 2021-06-14 PROCEDURE — 99214 OFFICE O/P EST MOD 30 MIN: CPT | Performed by: INTERNAL MEDICINE

## 2021-06-14 PROCEDURE — 82043 UR ALBUMIN QUANTITATIVE: CPT

## 2021-06-14 RX ORDER — GLIPIZIDE 10 MG/1
TABLET, FILM COATED, EXTENDED RELEASE ORAL
Qty: 180 TABLET | Refills: 3 | Status: SHIPPED | OUTPATIENT
Start: 2021-06-14 | End: 2021-11-11 | Stop reason: SDUPTHER

## 2021-06-14 RX ORDER — DAPAGLIFLOZIN 10 MG/1
10 TABLET, FILM COATED ORAL
Qty: 30 TABLET | Refills: 5 | Status: SHIPPED | OUTPATIENT
Start: 2021-06-14 | End: 2021-11-11 | Stop reason: SDUPTHER

## 2021-06-14 RX ORDER — ROSUVASTATIN CALCIUM 40 MG/1
40 TABLET, COATED ORAL DAILY
Qty: 90 TABLET | Refills: 3 | Status: SHIPPED | OUTPATIENT
Start: 2021-06-14 | End: 2021-11-11 | Stop reason: SDUPTHER

## 2021-06-14 NOTE — ASSESSMENT & PLAN NOTE
Diabetes is improving with treatment.   Continue current treatment regimen.  But titrate up farxiga.  Diabetes will be reassessed in 3 months.

## 2021-06-14 NOTE — PROGRESS NOTES
"     Office Note      Date: 2021  Patient Name: George Tolentino  MRN: [id number]  : 1980    Chief Complaint   Patient presents with   • Follow-up   • Diabetes       History of Present Illness:   George Tolentino is a 41 y.o. male who presents for Diabetes type 2. Diagnosed in: . Treated in past with oral agents, insulin and trulicity. Current treatments: farxiga, glipizide, metformin and trulicity.  Number of insulin shots per day: none. Checks blood sugar 2 times a week. Has low blood sugar: rare. Aspirin use: No - no indication. Statin use: Yes. ACE-I/ARB use: Yes. Changes in health since last visit: none. Last eye exam: 3/2021.    Subjective      Diabetic Complications:  Eyes: No  Kidneys: No  Feet: Yes - nephropathy  Heart: No    Diet and Exercise:  Meals per day: 3  Minutes of exercise per week: 0 mins.    Review of Systems:   Review of Systems   Constitutional: Negative.    Cardiovascular: Negative.    Gastrointestinal: Negative.    Endocrine: Negative.        The following portions of the patient's history were reviewed and updated as appropriate: allergies, current medications, past family history, past medical history, past social history, past surgical history and problem list.    Objective       Visit Vitals  /82   Pulse 75   Ht 188 cm (74\")   Wt 113 kg (250 lb 3.2 oz)   SpO2 98%   BMI 32.12 kg/m²       Physical Exam:  Physical Exam  Constitutional:       Appearance: Normal appearance.   Neurological:      Mental Status: He is alert.         Labs:    HbA1c  Lab Results   Component Value Date    HGBA1C 7.8 2021       CMP  Lab Results   Component Value Date    GLUCOSE 236 (H) 2020    BUN 15 2020    CREATININE 0.64 (L) 2020    EGFRIFNONA 140 2020    BCR 23.4 2020    K 4.4 2020    CO2 27.0 2020    CALCIUM 9.4 2020    AST 29 2020    ALT 56 (H) 2020        Lipid Panel  Lab Results   Component Value Date    HDL 40 2020    " LDL 50 11/02/2020    TRIG 146 11/02/2020        TSH  Lab Results   Component Value Date    TSH 0.946 11/02/2020        Hemoglobin A1C  Lab Results   Component Value Date    HGBA1C 7.8 06/14/2021        Microalbumin/Creatinine  Lab Results   Component Value Date    JUDEO 623.4 11/02/2020    MICROALBUR 38.4 11/02/2020           Assessment / Plan      Assessment & Plan:  Diagnoses and all orders for this visit:    1. Uncontrolled type 2 diabetes mellitus with hyperglycemia (CMS/[facility]) (Primary)  Assessment & Plan:  Diabetes is improving with treatment.   Continue current treatment regimen.  But titrate up farxiga.  Diabetes will be reassessed in 3 months.    Orders:  -     POC Glucose, Blood  -     POC Glycosylated Hemoglobin (Hb A1C)    2. Type 2 diabetes mellitus with microalbuminuria, without long-term current use of insulin (CMS/[facility])  Assessment & Plan:  Continue ACE-I and SGLT-2 inhibitor.  Check microalbumin today.    Orders:  -     Microalbumin / Creatinine Urine Ratio - Urine, Clean Catch; Future    3. Mixed hyperlipidemia  Assessment & Plan:  Continue statin.  Plan to check lipids next visit.      Other orders  -     Dapagliflozin Propanediol (Farxiga) 10 MG tablet; Take 10 mg by mouth Daily With Breakfast.  Dispense: 30 tablet; Refill: 5  -     glipizide (GLUCOTROL XL) 10 MG 24 hr tablet; TAKE TWO TABLETS BY MOUTH EVERY MORNING  Dispense: 180 tablet; Refill: 3  -     rosuvastatin (CRESTOR) 40 MG tablet; Take 1 tablet by mouth Daily.  Dispense: 90 tablet; Refill: 3      Return in about 3 months (around 9/14/2021) for Recheck with A1c, CMP, lipids, TSH, microalbumin.    Franco Schneider MD   06/14/2021

## 2021-07-14 RX ORDER — METFORMIN HYDROCHLORIDE 500 MG/1
TABLET, EXTENDED RELEASE ORAL
Qty: 180 TABLET | Refills: 3 | Status: SHIPPED | OUTPATIENT
Start: 2021-07-14 | End: 2021-11-11 | Stop reason: SDUPTHER

## 2021-07-20 RX ORDER — METFORMIN HYDROCHLORIDE 500 MG/1
TABLET, EXTENDED RELEASE ORAL
Qty: 180 TABLET | Refills: 0 | OUTPATIENT
Start: 2021-07-20

## 2021-07-20 NOTE — TELEPHONE ENCOUNTER
Duplicate request Rx E- Scripted   metFORMIN ER (GLUCOPHAGE-XR) 500 MG 24 hr tablet [[id number]]     Order Details  Dose, Route, Frequency: As Directed   Dispense Quantity: 180 tablet Refills: 3          Sig: TAKE TWO TABLETS BY MOUTH TWICE A DAY         Start Date: 07/14/21 End Date: --   Written Date: 07/14/21 Expiration Date: 07/14/22   Original Order:  metFORMIN ER (GLUCOPHAGE-XR) 500 MG 24 hr tablet [[id number]]   Providers    Ordering Provider and Authorizing Provider:    Blossom Jimenez PA   [address] [facility] [address] [address]   Phone:  [phone]   Fax:  [phone]   NPI:  [id number]        Ordering User:  Blossmo Jimenez PA        [address] - [address] [facility] [address] [facility] - [phone]  - [phone]    [address] [facility] [address] [place] [address]   Phone:  [phone]  Fax:  [phone]   Orders with any of the following pharmaceutical classes: Antidiabetic    Name Dose Frequency Start Date End Date Medication Warnings Interventions? Order Mode    Trulicity 1.5 MG/0.5ML solution pen-injector 1.5 mg Weekly 10/09/20    Outpatient    glipizide (GLUCOTROL XL) 10 MG 24 hr tablet   06/14/21    Outpatient    Dapagliflozin Propanediol (Farxiga) 10 MG tablet 10 mg Daily With Breakfast 06/14/21    Outpatient    metFORMIN ER (GLUCOPHAGE-XR) 500 MG 24 hr tablet   06/04/21 07/14/21   Outpatient    Warnings History    No Interaction Warnings Shown    Pharmacist Clinical Review History    This prescription has not been clinically reviewed.   Order Reconciliation Actions       Order Reconciliation Actions   E-Prescribing Status    Outpatient Medication Detail    metFORMIN ER (GLUCOPHAGE-XR) 500 MG 24 hr tablet        Sig: TAKE TWO TABLETS BY MOUTH TWICE A DAY        Sent to pharmacy as: metFORMIN HCl  MG Oral Tablet Extended Release 24 Hour (GLUCOPHAGE-XR)        Class: Normal        E-Prescribing Status:  Receipt confirmed by pharmacy (7/14/2021  4:49 PM EDT)

## 2021-09-01 ENCOUNTER — PRIOR AUTHORIZATION (OUTPATIENT)
Dept: ENDOCRINOLOGY | Facility: CLINIC | Age: 41
End: 2021-09-01

## 2021-09-01 NOTE — TELEPHONE ENCOUNTER
Approvedtoday  Approved.  Drug  Trulicity 1.5MG/0.5ML pen-injectors  Form  [employer] Non-Medicare Electronic PA Form (2017 [employer])

## 2021-09-03 ENCOUNTER — PRIOR AUTHORIZATION (OUTPATIENT)
Dept: ENDOCRINOLOGY | Facility: CLINIC | Age: 41
End: 2021-09-03

## 2021-09-03 NOTE — TELEPHONE ENCOUNTER
Deniedtoday  Denied request. A notification with additional details has been faxed to your office.  Drug  Farxiga 10MG tablets  Form  [facility] Non-Medicare Electronic PA Form (2017 [facility]    AWAITING DENIAL LETTER FOR REASON WHY

## 2021-10-11 ENCOUNTER — PRIOR AUTHORIZATION (OUTPATIENT)
Dept: ENDOCRINOLOGY | Facility: CLINIC | Age: 41
End: 2021-10-11

## 2021-10-11 NOTE — TELEPHONE ENCOUNTER
TYRA Tolentino Nunez: [id number] Marleny PA Case ID: [id number] - Rx #: [id number] help? Call us at [phone]  Outcome  Approvedtoday  Approved.  Drug  Farxiga 5MG tablets  Form  [employer] Non-Medicare Electronic PA Form (2017 N[employer])

## 2021-10-18 RX ORDER — LISINOPRIL 20 MG/1
TABLET ORAL
Qty: 60 TABLET | Refills: 5 | Status: SHIPPED | OUTPATIENT
Start: 2021-10-18 | End: 2021-11-11 | Stop reason: SDUPTHER

## 2021-10-19 RX ORDER — DULAGLUTIDE 1.5 MG/.5ML
INJECTION, SOLUTION SUBCUTANEOUS
Qty: 2 ML | Refills: 2 | Status: SHIPPED | OUTPATIENT
Start: 2021-10-19 | End: 2021-11-11 | Stop reason: SDUPTHER

## 2021-11-11 ENCOUNTER — OFFICE VISIT (OUTPATIENT)
Dept: ENDOCRINOLOGY | Facility: CLINIC | Age: 41
End: 2021-11-11

## 2021-11-11 ENCOUNTER — LAB (OUTPATIENT)
Dept: LAB | Facility: HOSPITAL | Age: 41
End: 2021-11-11

## 2021-11-11 ENCOUNTER — SPECIALTY PHARMACY (OUTPATIENT)
Dept: ENDOCRINOLOGY | Facility: CLINIC | Age: 41
End: 2021-11-11

## 2021-11-11 VITALS
DIASTOLIC BLOOD PRESSURE: 88 MMHG | SYSTOLIC BLOOD PRESSURE: 140 MMHG | WEIGHT: 249 LBS | HEART RATE: 83 BPM | OXYGEN SATURATION: 96 % | BODY MASS INDEX: 31.95 KG/M2 | HEIGHT: 74 IN

## 2021-11-11 DIAGNOSIS — E78.2 MIXED HYPERLIPIDEMIA: ICD-10-CM

## 2021-11-11 DIAGNOSIS — E11.29 TYPE 2 DIABETES MELLITUS WITH MICROALBUMINURIA, WITHOUT LONG-TERM CURRENT USE OF INSULIN (HCC): ICD-10-CM

## 2021-11-11 DIAGNOSIS — E11.65 UNCONTROLLED TYPE 2 DIABETES MELLITUS WITH HYPERGLYCEMIA (HCC): ICD-10-CM

## 2021-11-11 DIAGNOSIS — R80.9 TYPE 2 DIABETES MELLITUS WITH MICROALBUMINURIA, WITHOUT LONG-TERM CURRENT USE OF INSULIN (HCC): ICD-10-CM

## 2021-11-11 DIAGNOSIS — E11.65 UNCONTROLLED TYPE 2 DIABETES MELLITUS WITH HYPERGLYCEMIA (HCC): Primary | ICD-10-CM

## 2021-11-11 LAB
ALBUMIN SERPL-MCNC: 4.5 G/DL (ref 3.5–5.2)
ALBUMIN UR-MCNC: 50.9 MG/DL
ALBUMIN/GLOB SERPL: 1.7 G/DL
ALP SERPL-CCNC: 64 U/L (ref 39–117)
ALT SERPL W P-5'-P-CCNC: 44 U/L (ref 1–41)
ANION GAP SERPL CALCULATED.3IONS-SCNC: 9.1 MMOL/L (ref 5–15)
AST SERPL-CCNC: 22 U/L (ref 1–40)
BILIRUB SERPL-MCNC: 0.3 MG/DL (ref 0–1.2)
BUN SERPL-MCNC: 11 MG/DL (ref 6–20)
BUN/CREAT SERPL: 17.7 (ref 7–25)
CALCIUM SPEC-SCNC: 9.2 MG/DL (ref 8.6–10.5)
CHLORIDE SERPL-SCNC: 99 MMOL/L (ref 98–107)
CHOLEST SERPL-MCNC: 143 MG/DL (ref 0–200)
CO2 SERPL-SCNC: 24.9 MMOL/L (ref 22–29)
CREAT SERPL-MCNC: 0.62 MG/DL (ref 0.76–1.27)
CREAT UR-MCNC: 45.5 MG/DL
EXPIRATION DATE: ABNORMAL
EXPIRATION DATE: NORMAL
GFR SERPL CREATININE-BSD FRML MDRD: 144 ML/MIN/1.73
GLOBULIN UR ELPH-MCNC: 2.7 GM/DL
GLUCOSE BLDC GLUCOMTR-MCNC: 306 MG/DL (ref 70–130)
GLUCOSE SERPL-MCNC: 287 MG/DL (ref 65–99)
HBA1C MFR BLD: 9.4 %
HDLC SERPL-MCNC: 31 MG/DL (ref 40–60)
LDLC SERPL CALC-MCNC: 60 MG/DL (ref 0–100)
LDLC/HDLC SERPL: 1.44 {RATIO}
Lab: ABNORMAL
Lab: NORMAL
MICROALBUMIN/CREAT UR: 1118.7 MG/G
POTASSIUM SERPL-SCNC: 4.1 MMOL/L (ref 3.5–5.2)
PROT SERPL-MCNC: 7.2 G/DL (ref 6–8.5)
SODIUM SERPL-SCNC: 133 MMOL/L (ref 136–145)
TRIGL SERPL-MCNC: 337 MG/DL (ref 0–150)
TSH SERPL DL<=0.05 MIU/L-ACNC: 1.07 UIU/ML (ref 0.27–4.2)
VLDLC SERPL-MCNC: 52 MG/DL (ref 5–40)

## 2021-11-11 PROCEDURE — 84443 ASSAY THYROID STIM HORMONE: CPT

## 2021-11-11 PROCEDURE — 80053 COMPREHEN METABOLIC PANEL: CPT

## 2021-11-11 PROCEDURE — 82043 UR ALBUMIN QUANTITATIVE: CPT

## 2021-11-11 PROCEDURE — 82570 ASSAY OF URINE CREATININE: CPT

## 2021-11-11 PROCEDURE — 83036 HEMOGLOBIN GLYCOSYLATED A1C: CPT | Performed by: INTERNAL MEDICINE

## 2021-11-11 PROCEDURE — 80061 LIPID PANEL: CPT

## 2021-11-11 PROCEDURE — 82947 ASSAY GLUCOSE BLOOD QUANT: CPT | Performed by: INTERNAL MEDICINE

## 2021-11-11 PROCEDURE — 99214 OFFICE O/P EST MOD 30 MIN: CPT | Performed by: INTERNAL MEDICINE

## 2021-11-11 RX ORDER — DAPAGLIFLOZIN 10 MG/1
10 TABLET, FILM COATED ORAL
Qty: 30 TABLET | Refills: 5 | Status: SHIPPED | OUTPATIENT
Start: 2021-11-11 | End: 2021-12-09 | Stop reason: SDUPTHER

## 2021-11-11 RX ORDER — ROSUVASTATIN CALCIUM 40 MG/1
40 TABLET, COATED ORAL DAILY
Qty: 90 TABLET | Refills: 3 | Status: SHIPPED | OUTPATIENT
Start: 2021-11-11 | End: 2021-12-09 | Stop reason: SDUPTHER

## 2021-11-11 RX ORDER — METFORMIN HYDROCHLORIDE 500 MG/1
1000 TABLET, EXTENDED RELEASE ORAL 2 TIMES DAILY
Qty: 120 TABLET | Refills: 5 | Status: SHIPPED | OUTPATIENT
Start: 2021-11-11 | End: 2022-03-29 | Stop reason: SDUPTHER

## 2021-11-11 RX ORDER — LISINOPRIL 20 MG/1
40 TABLET ORAL DAILY
Qty: 60 TABLET | Refills: 5 | Status: SHIPPED | OUTPATIENT
Start: 2021-11-11 | End: 2022-03-29 | Stop reason: SDUPTHER

## 2021-11-11 RX ORDER — GLIPIZIDE 10 MG/1
20 TABLET, FILM COATED, EXTENDED RELEASE ORAL
Qty: 180 TABLET | Refills: 3 | Status: SHIPPED | OUTPATIENT
Start: 2021-11-11 | End: 2022-04-18 | Stop reason: CLARIF

## 2021-11-11 RX ORDER — DULAGLUTIDE 1.5 MG/.5ML
1.5 INJECTION, SOLUTION SUBCUTANEOUS
Qty: 2 ML | Refills: 5 | Status: SHIPPED | OUTPATIENT
Start: 2021-11-11 | End: 2021-12-09 | Stop reason: SDUPTHER

## 2021-11-11 NOTE — ASSESSMENT & PLAN NOTE
Diabetes is worsening.  But he has been without meds for awhile.  Resume all DM meds.  Consult with clinic pharmacists for med assistance.  Diabetes will be reassessed in 3 months.

## 2021-11-11 NOTE — PROGRESS NOTES
Specialty Pharmacy Patient Management Program  Endocrinology Initial Assessment       George Tolentino is a 41 y.o. male with Type 2 Diabetes seen by an Endocrinology provider and enrolled in the Endocrinology Patient Management program offered by [facility].  An initial outreach was conducted, including assessment of therapy appropriateness and specialty medication education for Trulicity and Farxiga. Mr. Tolentino also wishes to fill his glipizide, metformin, rosuvastatin, and lisinopril with  Retail (full med profile). The patient was introduced to services offered by [facility], including: regular assessments, refill coordination, curbside pick-up or mail order delivery options, prior authorization maintenance, and financial assistance programs as applicable. The patient was also provided with contact information for the pharmacy team.     Insurance Coverage & Financial Support  [employer] + coupons  Note, patient has had difficulty with frequent insurance plan changes given job changes related to covid-19 and seasonal work. Provided PAP forms for Trulicity and Farxiga should he lose insurance again in future.     Relevant Past Medical History and Comorbidities  Relevant medical history and concomitant health conditions were discussed with the patient. The patient's chart has been reviewed for relevant past medical history and comorbid health conditions and updated as necessary.     Past Medical History:   Diagnosis Date   • Anxiety    • Depression    • Diabetes mellitus (HCC)    • DM type 2 causing renal disease (HCC)    • GERD (gastroesophageal reflux disease)    • Hypertension    • Mixed hyperlipidemia    • Mood disorder (HCC)    • Obesity     body mass index 30+-obesity   • Type 2 diabetes mellitus, uncontrolled (HCC)      Social History     Socioeconomic History   • Marital status:    Tobacco Use   • Smoking status: Former Smoker     Types: Cigars      Start date: 2010   • Smokeless tobacco: Never Used   • Tobacco comment: 1 per week   Vaping Use   • Vaping Use: Former   • Substances: THC   • Devices: Pre-filled or refillable cartridge   Substance and Sexual Activity   • Alcohol use: Yes     Comment: 10 drinks/week   • Drug use: Yes     Types: Marijuana   • Sexual activity: Defer       Allergies  Known allergies and reactions were discussed with the patient. The patient's chart has been reviewed for  allergy information and updated as necessary.   Patient has no known allergies.    Current Medication List  This medication list has been reviewed with the patient and evaluated for any interactions or necessary modifications/recommendations, and updated to include all prescription medications, OTC medications, and supplements the patient is currently taking.  This list reflects what is contained in the patient's profile, which has also been marked as reviewed to communicate to other providers it is the most up to date version of the patient's current medication therapy.     Current Outpatient Medications:   •  Dapagliflozin Propanediol (Farxiga) 10 MG tablet, Take 10 mg by mouth Daily With Breakfast., Disp: 30 tablet, Rfl: 5  •  glipizide (GLUCOTROL XL) 10 MG 24 hr tablet, TAKE TWO TABLETS BY MOUTH EVERY MORNING, Disp: 180 tablet, Rfl: 3  •  lisinopril (PRINIVIL,ZESTRIL) 20 MG tablet, TAKE TWO TABLETS BY MOUTH DAILY, Disp: 60 tablet, Rfl: 5  •  metFORMIN ER (GLUCOPHAGE-XR) 500 MG 24 hr tablet, TAKE TWO TABLETS BY MOUTH TWICE A DAY, Disp: 180 tablet, Rfl: 3  •  rosuvastatin (CRESTOR) 40 MG tablet, Take 1 tablet by mouth Daily., Disp: 90 tablet, Rfl: 3  •  Trulicity 1.5 MG/0.5ML solution pen-injector, INJECT 1.5 MG UNDER THE SKIN INTO THE APPROPRIATE AREA ONCE WEEKLY AS DIRECTED, Disp: 2 mL, Rfl: 2    Drug Interactions  None identified      Recommended Medications Assessment  • Aspirin - Not Indicated  • Statin - Currently Taking  • ACEi/ARB - Currently Taking      Relevant Laboratory Values  A1C Last 3 Results    HGBA1C Last 3 Results 2/15/21 6/14/21 11/11/21   Hemoglobin A1C 8.7 7.8 9.4           Lab Results   Component Value Date    HGBA1C 9.4 11/11/2021     Lab Results   Component Value Date    GLUCOSE 236 (H) 11/02/2020    CALCIUM 9.4 11/02/2020     11/02/2020    K 4.4 11/02/2020    CO2 27.0 11/02/2020     11/02/2020    BUN 15 11/02/2020    CREATININE 0.64 (L) 11/02/2020    EGFRIFNONA 140 11/02/2020    BCR 23.4 11/02/2020    ANIONGAP 8.0 11/02/2020     Lab Results   Component Value Date    CHOL 115 11/02/2020    TRIG 146 11/02/2020    HDL 40 11/02/2020    LDL 50 11/02/2020     Microalbumin    Microalbumin 6/14/21   Microalbumin, Urine 29.0             Initial Education Provided for Specialty Medication  The patient has been provided with the following education for the therapies indicated and  all questions and concerns have been addressed prior to the patient receiving the medication. The patient has verbalized understanding of the education and any materials provided.  Additional patient education shall be provided and documented upon request by the patient, provider or payer.      Farxiga (dapagliflozin)   · Discussed the medication's MOA and that it may cause more frequent urination particularly upon starting the medication  · Take one tablet in the morning with or without food    · Encouraged to drink plenty of water as to not get dehydrated especially in warmer weather or with activity  · Also discussed there may be an increased incidence of UTIs or yeast infections and to monitor for signs/symptoms such as redness, itching, pain/burning, discharge, or odor.     Trulicity (dulaglutide)   · Discussed the medication's MOA and that it helps you feel full, helps your body release more insulin and helps your body make less sugar after meals.  · Also discussed that N/V/D tend to be the most common adverse effects, especially if larger meals are eaten.   Encouraged smaller meals throughout the day.  · Do not start the medication if you have h/o pancreatitis or thyroid cancer.  · Administer once weekly with or without food. Take missed dose as soon as remember and if it is less than 3 days until the next dose, skip the missed dose and get back on track; do not take 2 doses or extra doses.  · Each pen contains one dose. Store pens in the refrigerator until use, pens are stable at room temperature for 14 days.   · Administration: Make sure the pen is locked, pull the base cap straight off and throw it away in household trash. Place the clear base flat and firmly against your skin at the injection site and unlock by turning the lock ring. Press and hold the green inject button, you will hear a loud click. Continue holding firmly against skin until second click in about 5-10 seconds then  remove pen from skin and discard in sharps container.     Adherence and Self-Administration  • Barriers to Patient Adherence and/or Self-Administration: None currently however, medication access has been a barrier in past  • Methods for Supporting Patient Adherence and/or Self-Administration: Refill reminders, shipping medications, coupons     Goals of Therapy  • Patient Goals of Therapy: Obtain and take medication consistently moving forward to aid in A1c reduction   • Clinical Goals or Therapeutic Targets, If Applicable: A1c reduction     Reassessment Plan & Follow-Up  1. Pharmacist to perform regular reassessments no more than (6) months from the previous assessment.  2. Welcome information and patient satisfaction survey to be sent by retail team with patient's initial fill.  3. Address, contact information, and payment confirmed.   4. Care Coordinator to set up future refill outreaches, coordinate prescription delivery, and escalate clinical questions to pharmacist.     Additional Plans, Therapy Recommendations or Therapy Problems to Be Addressed: None     Attestation  I attest  that the initiated specialty medication(s) are appropriate for the patient based on my assessment.  If the prescribed therapy is at any point deemed not appropriate based on the current or future assessments, a consultation will be initiated with the patient's specialty care provider to determine the best course of action. The revised plan of therapy will be documented along with any additional patient education provided.     Layne Zhao, PharmD   11/11/2021  09:11 EST

## 2021-11-11 NOTE — PROGRESS NOTES
"     Office Note      Date: 2021  Patient Name: George Tolentino  MRN: [id number]  : 1980    Chief Complaint   Patient presents with   • Diabetes       History of Present Illness:   George Tolentino is a 41 y.o. male who presents for Diabetes type 2. Diagnosed in: . Treated in past with oral agents, insulin and trulicity. Current treatments: farxiga, glipizide, metformin and trulicity.  Number of insulin shots per day: none. Checks blood sugar 2 times a week. Has low blood sugar: rare. Aspirin use: No - no indication. Statin use: Yes. ACE-I/ARB use: Yes. Changes in health since last visit: none. Last eye exam: 3/2021.    He has been stressed.  The [employer] where he works recently sold.    He had issues affording his meds.  He was without insurance for a while.  He just resumed some of the meds 3 weeks ago.    Subjective      Diabetic Complications:  Eyes: No  Kidneys: No  Feet: Yes - nephropathy  Heart: No    Diet and Exercise:  Meals per day: 3  Minutes of exercise per week: 0 mins.    Review of Systems:   Review of Systems   Constitutional: Negative.    Cardiovascular: Negative.    Gastrointestinal: Negative.    Endocrine: Negative.        The following portions of the patient's history were reviewed and updated as appropriate: allergies, current medications, past family history, past medical history, past social history, past surgical history and problem list.    Objective       Visit Vitals  /88   Pulse 83   Ht 188 cm (74\")   Wt 113 kg (249 lb)   SpO2 96%   BMI 31.97 kg/m²       Physical Exam:  Physical Exam  Constitutional:       Appearance: Normal appearance.   Cardiovascular:      Pulses:           Dorsalis pedis pulses are 2+ on the right side and 2+ on the left side.        Posterior tibial pulses are 2+ on the right side and 2+ on the left side.   Feet:      Right foot:      Protective Sensation: 5 sites tested. 5 sites sensed.      Skin integrity: Skin integrity normal.      Toenail " Condition: Right toenails are normal.      Left foot:      Protective Sensation: 5 sites tested. 5 sites sensed.      Skin integrity: Skin integrity normal.      Toenail Condition: Left toenails are normal.   Neurological:      Mental Status: He is alert.         Labs:    HbA1c  Lab Results   Component Value Date    HGBA1C 9.4 11/11/2021       CMP  Lab Results   Component Value Date    GLUCOSE 236 (H) 11/02/2020    BUN 15 11/02/2020    CREATININE 0.64 (L) 11/02/2020    EGFRIFNONA 140 11/02/2020    BCR 23.4 11/02/2020    K 4.4 11/02/2020    CO2 27.0 11/02/2020    CALCIUM 9.4 11/02/2020    AST 29 11/02/2020    ALT 56 (H) 11/02/2020        Lipid Panel  Lab Results   Component Value Date    HDL 40 11/02/2020    LDL 50 11/02/2020    TRIG 146 11/02/2020        TSH  Lab Results   Component Value Date    TSH 0.946 11/02/2020        Hemoglobin A1C  Lab Results   Component Value Date    HGBA1C 9.4 11/11/2021        Microalbumin/Creatinine  Lab Results   Component Value Date    MALBCRERATIO 489.0 06/14/2021    MICROALBUR 29.0 06/14/2021           Assessment / Plan      Assessment & Plan:  Diagnoses and all orders for this visit:    1. Uncontrolled type 2 diabetes mellitus with hyperglycemia (HCC) (Primary)  Assessment & Plan:  Diabetes is worsening.  But he has been without meds for awhile.  Resume all DM meds.  Consult with clinic pharmacists for med assistance.  Diabetes will be reassessed in 3 months.    Orders:  -     POC Glycosylated Hemoglobin (Hb A1C)  -     POC Glucose, Blood  -     Comprehensive Metabolic Panel; Future  -     Lipid Panel; Future  -     Microalbumin / Creatinine Urine Ratio - Urine, Clean Catch; Future  -     TSH; Future    2. Type 2 diabetes mellitus with microalbuminuria, without long-term current use of insulin (HCC)  Assessment & Plan:  Continue ACE-I and SGLT-2 inhibitor.      3. Mixed hyperlipidemia  Assessment & Plan:  Continue statin.  Check lipids today.        Return in about 3 months (around  2/11/2022) for Recheck with A1c.    Franco Schneider MD   11/11/2021

## 2021-11-11 NOTE — TELEPHONE ENCOUNTER
Patient wishes to fill all medications with [facility] and enroll pt in pharmacy services. Prescriptions pended for provider review and signature.     Layne Zhoa, PharmD  11/11/2021  08:53 EST

## 2021-11-19 ENCOUNTER — SPECIALTY PHARMACY (OUTPATIENT)
Dept: ENDOCRINOLOGY | Facility: CLINIC | Age: 41
End: 2021-11-19

## 2021-11-19 NOTE — PROGRESS NOTES
Specialty Pharmacy Refill Coordination Note     George is a 41 y.o. male contacted today regarding refills of the following medication(s): Glipizide, Metformin, Lisinopril    Specialty medication(s) and dose(s) confirmed: Yes  Changes to medications: no  Changes to insurance: no  Reviewed and verified with patient: Allergies  Meds  Problems         Refill Questions      Most Recent Value   Changes to allergies? No   Changes to medications? No   How does patient/caregiver feel medication is working? Very good   Financial problems or insurance changes  No          Delivery Questions      Most Recent Value   Delivery method FedEx   Delivery address correct? Yes   Preferred delivery time? Anytime   Number of medications in delivery 3   Medication being filled and delivered Glipizide, Metformin, Lisinopril   Doses left of specialty medications ~5   Is there any medication that is due not being filled? No   Supplies needed? No supplies needed   Cooler needed? No   Do any medications need mixed or dated? No   Copay form of payment Credit card on file   Questions or concerns for the pharmacist? No          Medication Adherence    Any gaps in refill history greater than 2 weeks in the last 3 months: no  Demonstrates understanding of importance of adherence: yes  Informant: patient  Reliability of informant: reliable  Provider-estimated medication adherence level: good          Follow-up: ~3 weeks      Layne Zhao CAROLE  Specialty Clinical Pharmacist   11/19/2021   11:24 EST

## 2021-12-09 ENCOUNTER — SPECIALTY PHARMACY (OUTPATIENT)
Dept: ENDOCRINOLOGY | Facility: CLINIC | Age: 41
End: 2021-12-09

## 2021-12-09 RX ORDER — DAPAGLIFLOZIN 10 MG/1
10 TABLET, FILM COATED ORAL
Qty: 30 TABLET | Refills: 5 | Status: SHIPPED | OUTPATIENT
Start: 2021-12-09 | End: 2022-01-06 | Stop reason: CLARIF

## 2021-12-09 RX ORDER — DULAGLUTIDE 1.5 MG/.5ML
1.5 INJECTION, SOLUTION SUBCUTANEOUS
Qty: 2 ML | Refills: 5 | Status: SHIPPED | OUTPATIENT
Start: 2021-12-09 | End: 2022-03-29 | Stop reason: SDUPTHER

## 2021-12-09 RX ORDER — CELECOXIB 200 MG/1
200 CAPSULE ORAL DAILY
COMMUNITY
Start: 2021-11-18 | End: 2021-12-27

## 2021-12-09 RX ORDER — ROSUVASTATIN CALCIUM 40 MG/1
40 TABLET, COATED ORAL DAILY
Qty: 90 TABLET | Refills: 3 | Status: SHIPPED | OUTPATIENT
Start: 2021-12-09 | End: 2022-12-21 | Stop reason: SDUPTHER

## 2021-12-09 NOTE — PROGRESS NOTES
Specialty Pharmacy Refill Coordination Note     George is a 41 y.o. male contacted today regarding refills of his specialty medication(s).    Specialty medication(s) and dose(s) confirmed: Trulicity and Farxiga  Changes to medications: no  Changes to insurance: yes, no  Reviewed and verified with patient:        Refill Questions      Most Recent Value   Changes to allergies? No   Changes to medications? No   New conditions since last clinic visit No   Unplanned office visit, urgent care, ED, or hospital admission in the last 4 weeks  No   How does patient/caregiver feel medication is working? Good   Financial problems or insurance changes  No          Delivery Questions      Most Recent Value   Delivery method FedEx   Delivery address correct? Yes   Preferred delivery time? Anytime   Number of medications in delivery 3   Medication being filled and delivered Trulicity, Farxiga, Rosuvastatin   Copay form of payment Credit card on file   Questions or concerns for the pharmacist? No               Follow-up: 28 day(s)     Yeni Clayton Pharmacy Technician  Specialty Pharmacy Technician   12/9/2021   08:21 EST

## 2021-12-09 NOTE — PROGRESS NOTES
Noted pt did report changes to his medications. States he recently started Celebrex for arthritis but unsure how long he will continue. Has follow-up appointment with prescribing provider later this month. Will follow-up with pt at next refill call to determine if he is still taking and if so, where he would like it to be filled.     Layne Zhao, PharmD  12/9/2021  10:08 EST

## 2021-12-15 ENCOUNTER — SPECIALTY PHARMACY (OUTPATIENT)
Dept: ENDOCRINOLOGY | Facility: CLINIC | Age: 41
End: 2021-12-15

## 2021-12-15 NOTE — PROGRESS NOTES
Called patient and he stated that he had at least 2 weeks left of his Metformin, Lisinopril, and Glipizide. Pt requested we follow up with him the last week of December to see if he needs them then.    Yeni Clayton CPhT  12/15/2021  10:24 EST

## 2021-12-27 ENCOUNTER — SPECIALTY PHARMACY (OUTPATIENT)
Dept: ENDOCRINOLOGY | Facility: CLINIC | Age: 41
End: 2021-12-27

## 2021-12-27 NOTE — PROGRESS NOTES
Specialty Pharmacy Refill Coordination Note     George is a 41 y.o. male contacted today regarding refills of  Glipizide, Metformin, and Lisinopril specialty medication(s).    Reviewed and verified with patient:       Specialty medication(s) and dose(s) confirmed: yes    Refill Questions      Most Recent Value   Changes to allergies? No          Delivery Questions      Most Recent Value   Delivery method FedEx   Delivery address correct? Yes   Preferred delivery time? Anytime   Number of medications in delivery 3   Medication being filled and delivered Glipizide, Metformin, Lisinopril   Copay form of payment Credit card on file   Questions or concerns for the pharmacist? No        Asked patient about a prescription for Celebrex that he had mentioned that he started taking on the last outreach call. Pt stated he was not familiar with this medication and has never taken before. Unsure if patient was confused or if writer documented in error at time of last refill. As a result, will alert PharmD to remove Celebrex from patient's medication list as patient denies taking at this time.        Follow-up: 30 day(s)     Yeni Clayton, Pharmacy Technician  Specialty Pharmacy Technician   Yeni Clayton CPhT  12/27/2021  09:10 EST      Reviewed above, Celebrex removed from patient's medication list.      Layne Zhao PharmD, [facility]  Specialty Clinical Pharmacist  12/27/2021  09:35 EST

## 2022-01-06 ENCOUNTER — SPECIALTY PHARMACY (OUTPATIENT)
Dept: ENDOCRINOLOGY | Facility: CLINIC | Age: 42
End: 2022-01-06

## 2022-01-10 ENCOUNTER — SPECIALTY PHARMACY (OUTPATIENT)
Dept: ENDOCRINOLOGY | Facility: CLINIC | Age: 42
End: 2022-01-10

## 2022-01-10 NOTE — TELEPHONE ENCOUNTER
Farxiga is no longer preferred SGLT-2 on patient's insurance plan, cost increased to $513.71/30 days or $338.71 with coupon. Appears newly preferred SGLT-2 inhibitor is Steglatro. Discussed with ingrid Garcia to switch to Steglatro 15mg daily. Discussed with patient and provided education, he expressed understanding. Will pend Rx for provider review and signature.     Layne Zhao, PharmD, BCACP  Specialty Clinical Pharmacist  1/10/2022  10:15 EST

## 2022-01-10 NOTE — PROGRESS NOTES
Specialty Pharmacy Patient Management Program  Endocrinology Initial Assessment     George Tolentino is a 41 y.o. male with Type 2 Diabetes seen by an Endocrinology provider and enrolled in the Endocrinology Patient Management program offered by [facility]. An initial outreach was conducted, including assessment of therapy appropriateness and specialty medication education for STEGLATRO, to replace Farxiga. The patient is familiar with services offered by [facility], including: regular assessments, refill coordination, curbside pick-up or mail order delivery options, prior authorization maintenance, and financial assistance programs as applicable. The patient was also has contact information for the pharmacy team.     Patient's insurance formulary-preferred SGLT-2 inhibitor changed from Farxiga to Steglatro on 1/1/2022. Farxiga Copay increased to $513.71/30 days ($338.71 with coupon), which patient cannot afford. Steglatro copay $40/30 days but reduced to $0 with coupon.     Insurance Coverage & Financial Support  [employer] Insurance +  Coupons    Relevant Past Medical History and Comorbidities  Relevant medical history and concomitant health conditions were discussed with the patient. The patient's chart has been reviewed for relevant past medical history and comorbid health conditions and updated as necessary.   Past Medical History:   Diagnosis Date   • Anxiety    • Depression    • Diabetes mellitus (HCC)    • DM type 2 causing renal disease (HCC)    • GERD (gastroesophageal reflux disease)    • Hypertension    • Mixed hyperlipidemia    • Mood disorder (HCC)    • Obesity     body mass index 30+-obesity   • Type 2 diabetes mellitus, uncontrolled (HCC)      Social History     Socioeconomic History   • Marital status:    Tobacco Use   • Smoking status: Former Smoker     Types: Cigars     Start date: 2010   • Smokeless tobacco: Never Used   •  Tobacco comment: 1 per week   Vaping Use   • Vaping Use: Former   • Substances: THC   • Devices: Pre-filled or refillable cartridge   Substance and Sexual Activity   • Alcohol use: Yes     Comment: 10 drinks/week   • Drug use: Yes     Types: Marijuana   • Sexual activity: Defer       Allergies  Known allergies and reactions were discussed with the patient. The patient's chart has been reviewed for  allergy information and updated as necessary.   Patient has no known allergies.    Current Medication List  This medication list has been reviewed with the patient and evaluated for any interactions or necessary modifications/recommendations, and updated to include all prescription medications, OTC medications, and supplements the patient is currently taking.  This list reflects what is contained in the patient's profile, which has also been marked as reviewed to communicate to other providers it is the most up to date version of the patient's current medication therapy.     Current Outpatient Medications:   •  glipizide (GLUCOTROL XL) 10 MG 24 hr tablet, Take 2 tablets by mouth Every Morning., Disp: 180 tablet, Rfl: 3  •  lisinopril (PRINIVIL,ZESTRIL) 20 MG tablet, Take 2 tablets by mouth Daily., Disp: 60 tablet, Rfl: 5  •  metFORMIN ER (GLUCOPHAGE-XR) 500 MG 24 hr tablet, Take 2 tablets by mouth 2 (Two) Times a Day., Disp: 120 tablet, Rfl: 5  •  rosuvastatin (CRESTOR) 40 MG tablet, Take 1 tablet by mouth Daily., Disp: 90 tablet, Rfl: 3  •  Trulicity 1.5 MG/0.5ML solution pen-injector, Inject 1.5 mg under the skin into the appropriate area as directed Every 7 (Seven) Days., Disp: 2 mL, Rfl: 5     Rx for Steglatro 15mg oral once daily pending provider review and signature     Drug Interactions  · Steglatro + Glipizide: Concurrent use may increase risk for hypoglycemia. Pt has been tolerating Farxiga + Glipizide well, with no reported s/sx of hypoglycemia and pt's A1c above goal. No further action warranted at this time,  continue to monitor SMBG/A1c and or s/sx of hypoglycemia.   · Glipizide + Trulicity:  Concurrent use may increase risk for hypoglycemia. Pt currently tolerating Trulicity + Glipizide well, with no reported s/sx of hypoglycemia and pt's A1c above goal. No further action warranted at this time, continue to monitor SMBG/A1c and or s/sx of hypoglycemia.     Recommended Medications Assessment  • Aspirin - Not Indicated  • Statin - Currently Taking  • ACEi/ARB - Currently Taking    Relevant Laboratory Values  A1C Last 3 Results    HGBA1C Last 3 Results 2/15/21 6/14/21 11/11/21   Hemoglobin A1C 8.7 7.8 9.4           Lab Results   Component Value Date    HGBA1C 9.4 11/11/2021     Lab Results   Component Value Date    GLUCOSE 287 (H) 11/11/2021    CALCIUM 9.2 11/11/2021     (L) 11/11/2021    K 4.1 11/11/2021    CO2 24.9 11/11/2021    CL 99 11/11/2021    BUN 11 11/11/2021    CREATININE 0.62 (L) 11/11/2021    EGFRIFNONA 144 11/11/2021    BCR 17.7 11/11/2021    ANIONGAP 9.1 11/11/2021     Lab Results   Component Value Date    CHOL 143 11/11/2021    TRIG 337 (H) 11/11/2021    HDL 31 (L) 11/11/2021    LDL 60 11/11/2021     Microalbumin    Microalbumin 6/14/21 11/11/21   Microalbumin, Urine 29.0 50.9             Initial Education Provided for Specialty Medication  The patient has been provided with the following education and any applicable administration techniques (i.e. self-injection) have been demonstrated for the therapies indicated. All questions and concerns have been addressed prior to the patient receiving the medication, and the patient has verbalized understanding of the education and any materials provided.  Additional patient education shall be provided and documented upon request by the patient, provider or payer.      STEGLATRO® (ertugliflozin)  Medication Expectations   Why am I taking this medication? You are taking this medication to lower blood sugar because you have type 2 diabetes. Diabetes is not  curable but with proper medication and treatment, we can keep your blood sugar within your personalized target range.    What should I expect while on this medication? You should expect to see your blood sugar and A1c decrease over time. It can also help some people lose weight.     How does the medication work? Steglatro works by helping to remove some sugar that the body doesn't need through urination.    How long will I be on this medication for? The amount of time you will be on this medication will be determined by your doctor based on blood sugar and A1c control. You will most likely be on this medication or another diabetes medication throughout your lifetime. Do not abruptly stop this medication without talking to your doctor first.    How do I take this medication? Take as directed on your prescription label. This medication is usually taken in the morning and can be given with or without food.    What are some possible side effects? You may notice increased urination, especially when you first start Steglatro. It can cause some people to become dehydrated (loss of body water and salt). The most common side effects are urinary tract infections and yeast infections and are more commonly seen in females. Talk with your doctor if you notice white or yellow vaginal discharge, vaginal itching or odor of if you notice redness, itching, pain, or swelling of the penis and/or bad-smelling discharge from the penis.    What happens if I miss a dose? If you miss a dose, take it as soon as you remember. If it is close to your next dose, skip it (do not take 2 doses at once)     Medication Safety   What are things I should warn my doctor immediately about? Tell your doctor if you have kidney disease or are on dialysis, low blood pressure, and/or high cholesterol. Talk with your doctor if you have a history of ketoacidosis, history of lower limp amputations, or history of frequent genital yeast or urinary tract  infections. Tell your doctor if you are on a low-salt diet, if you drink alcohol, or if you are having surgery. Talk to your doctor if you are pregnant, planning to become pregnant, or breastfeeding. Also tell your doctor if you notice any signs/symptoms of an allergic reaction (rash, hives, difficulty breathing, etc.).   What are things that I should be cautious of? Be cautious of any side effects from this medication. Talk to your doctor if any new ones develop or aren't getting better.   What are some medications that can interact with this one? Some medications that interact include diuretics (water pills) and other medications that may also lower your blood sugar such as insulins and glipizide/glimepiride/glyburide. Your doctor may reduce the dose of these medications when you start Steglatro to minimize low blood sugars. Always tell your doctor or pharmacist immediately if you start taking any new medications, including over-the-counter medications, vitamins, and herbal supplements.      Medication Storage/Handling   How should I handle this medication? Keep this medication out of reach of pets/children in tightly sealed container   How does this medication need to be stored? Store at room temperature and keep dry (don't keep in bathroom or other room with moisture)   How should I dispose of this medication? There should not be a need to dispose of this medication unless your provider decides to change the dose or therapy. If that is the case, take to your local police station for proper disposal. Some pharmacies also have take-back bins for medication drop-off.      Resources/Support   How can I remind myself to take this medication? You can download reminder apps to help you manage your refills. You may also set an alarm on your phone to remind you. The pharmacy carries pill boxes that you can place next to an area you pass everyday (such as where you place your car keys or where you charge your phone)   Is  financial support available?  [employer] can provide co-pay cards if you have commercial insurance or patient assistance if you have Medicare or no insurance.    Which vaccines are recommended for me? Talk to your doctor about these vaccines: Flu, Coronavirus (COVID-19), Pneumococcal (pneumonia), Tdap, Hepatitis B, Zoster (shingles)          Adherence and Self-Administration  • Barriers to Patient Adherence and/or Self-Administration: None identified for Steglatro at this time    • Methods for Supporting Patient Adherence and/or Self-Administration: Coupons, refill coordination/mailed prescriptions     Goals of Therapy  · Patient Goals of Therapy: Obtain and take medication consistently moving forward to aid in A1c reduction   • Clinical Goals or Therapeutic Targets, If Applicable: A1c reduction to 6-7%     Reassessment Plan & Follow-Up  1. Medication Therapy Changes: Stop Farxiga 10mg daily, start Steglatro 15mg daily. Jardiance Rx discontinued as not covered under plan   2. Additional Plans, Therapy Recommendations, or Therapy Problems to Be Addressed: None   3. Pharmacist to perform regular reassessments no more than (6) months from the previous assessment.  4. Welcome information and patient satisfaction survey to be sent by retail team with patient's initial fill.  5. Care Coordinator to set up future refill outreaches, coordinate prescription delivery, and escalate clinical questions to pharmacist.     Attestation  I attest that the initiated specialty medication(s) are appropriate for the patient based on my assessment.  If the prescribed therapy is at any point deemed not appropriate based on the current or future assessments, a consultation will be initiated with the patient's specialty care provider to determine the best course of action. The revised plan of therapy will be documented along with any additional patient education provided.     Layne Zhao, PharmD, BCACP   1/10/2022  10:27 EST

## 2022-01-25 ENCOUNTER — SPECIALTY PHARMACY (OUTPATIENT)
Dept: ENDOCRINOLOGY | Facility: CLINIC | Age: 42
End: 2022-01-25

## 2022-01-27 ENCOUNTER — SPECIALTY PHARMACY (OUTPATIENT)
Dept: ENDOCRINOLOGY | Facility: CLINIC | Age: 42
End: 2022-01-27

## 2022-01-27 NOTE — PROGRESS NOTES
Specialty Pharmacy Refill Coordination Note     George is a 41 y.o. male contacted today regarding refills of his specialty medication(s).    Specialty medication(s) and dose(s) confirmed: Lisinopril, Glipizide, Metformin  Changes to medications: no  Changes to insurance: yes, day supply restriction removed; Copays have increased from some medications and decreased for others  Reviewed and verified with patient:         Refill Questions      Most Recent Value   Changes to allergies? No   Changes to medications? No   New conditions since last clinic visit No   Unplanned office visit, urgent care, ED, or hospital admission in the last 4 weeks  Yes  [[facility] for cough/SOA in December 2021, since resolved]   How does patient/caregiver feel medication is working? Excellent   Financial problems or insurance changes  Yes   If yes, describe changes in insurance or financial issues. Insurance now allows 90 day fills,  some copays increased   How many doses of your specialty medications were missed in the last 4 weeks? 0          Delivery Questions      Most Recent Value   Delivery method FedEx   Delivery address correct? Yes   Preferred delivery time? Anytime   Number of medications in delivery 3   Medication being filled and delivered Metformin, Lisinopril, Glipizide   Doses left of specialty medications a few   Is there any medication that is due not being filled? No   Supplies needed? No supplies needed   Cooler needed? No   Do any medications need mixed or dated? No   Copay form of payment Credit card on file   Additional comments Total $86.92   Questions or concerns for the pharmacist? No   Are any medications first time fills? No          Also of note, patient states he is currently doing well on newly prescribed Steglatro (in place of Farxiga). Denies any ADEs or change in SMBG.       Follow-up: 3 month(s)     Layne Zhao PharmD, BCACP  Specialty Clinical Pharmacist  1/27/2022  10:19 EST

## 2022-02-07 ENCOUNTER — SPECIALTY PHARMACY (OUTPATIENT)
Dept: ENDOCRINOLOGY | Facility: CLINIC | Age: 42
End: 2022-02-07

## 2022-02-07 NOTE — PROGRESS NOTES
Specialty Pharmacy Refill Coordination Note     George is a 41 y.o. male contacted today regarding refills of his specialty medication(s).    Specialty medication(s) and dose(s) confirmed: Steglatro 15mg daily  Changes to medications: no  Changes to insurance: no  Reviewed and verified with patient:         Refill Questions      Most Recent Value   Changes to allergies? No   Changes to medications? No   New conditions since last clinic visit No   Unplanned office visit, urgent care, ED, or hospital admission in the last 4 weeks  No   How does patient/caregiver feel medication is working? Very good   Financial problems or insurance changes  No   How many doses of your specialty medications were missed in the last 4 weeks? 0          Delivery Questions      Most Recent Value   Delivery method FedEx   Delivery address correct? Yes   Preferred delivery time? Anytime   Number of medications in delivery 1   Medication being filled and delivered Steglatro   Doses left of specialty medications ~7   Is there any medication that is due not being filled? No   Supplies needed? No supplies needed   Cooler needed? No   Do any medications need mixed or dated? No   Copay form of payment Credit card on file   Additional comments $0 for 90 day supply   Questions or concerns for the pharmacist? No   Are any medications first time fills? No            Follow-up: ~3 month(s)     Layne Zhao, PharmD, BCACP  Specialty Clinical Pharmacist  2/7/2022  13:06 EST

## 2022-03-29 ENCOUNTER — SPECIALTY PHARMACY (OUTPATIENT)
Dept: ENDOCRINOLOGY | Facility: CLINIC | Age: 42
End: 2022-03-29

## 2022-03-29 RX ORDER — LISINOPRIL 40 MG/1
40 TABLET ORAL DAILY
Qty: 90 TABLET | Refills: 3 | Status: SHIPPED | OUTPATIENT
Start: 2022-03-29

## 2022-03-29 RX ORDER — DULAGLUTIDE 1.5 MG/.5ML
1.5 INJECTION, SOLUTION SUBCUTANEOUS
Qty: 6 ML | Refills: 3 | Status: SHIPPED | OUTPATIENT
Start: 2022-03-29 | End: 2022-04-18 | Stop reason: DRUGHIGH

## 2022-03-29 RX ORDER — METFORMIN HYDROCHLORIDE 500 MG/1
1000 TABLET, EXTENDED RELEASE ORAL 2 TIMES DAILY
Qty: 360 TABLET | Refills: 3 | Status: SHIPPED | OUTPATIENT
Start: 2022-03-29

## 2022-03-29 NOTE — PROGRESS NOTES
Specialty Pharmacy Refill Coordination Note     George is a 42 y.o. male contacted today regarding refills of  Rosuvastatin and Trulicity specialty medication(s).    Reviewed and verified with patient:         Specialty medication(s) and dose(s) confirmed: yes    Refill Questions    Flowsheet Row Most Recent Value   Changes to allergies? No   Changes to medications? No   New conditions since last clinic visit No   Unplanned office visit, urgent care, ED, or hospital admission in the last 4 weeks  No   Financial problems or insurance changes  No   How many doses of your specialty medications were missed in the last 4 weeks? none   Does this patient require a clinical escalation to a pharmacist? No          Delivery Questions    Flowsheet Row Most Recent Value   Delivery method FedEx   Delivery address correct? Yes   Preferred delivery time? Anytime   Number of medications in delivery 2   Medication being filled and delivered Trulicity and Rosuvastatin   Copay form of payment Credit card on file   Questions or concerns for the pharmacist? No        Needs refill on Trulicity         Follow-up: 90 day(s)    Yeni Clayton CPhT  Pharmacy Care Coordinator  3/29/2022  09:36 EDT

## 2022-03-29 NOTE — TELEPHONE ENCOUNTER
Pending Prescription renewals for Trulicity, Steglatro, Lisinopril, and metformin for endo provider review and signature.     Layne Zhao, PharmD, BCACP  Specialty Clinical Pharmacist  3/29/2022  09:55 EDT

## 2022-03-29 NOTE — PROGRESS NOTES
Trulicity prescription does not have sufficient quantity remaining on prescription to fill 90-day supply. Will pend Rx for Trulicity for Dr. Schneider to review and sign. Will also request renewals of metformin, lisinopril, and steglatro to be profiled at this time. Note patient currently taking #2 lisinopril 20mg tablets daily for HTN for TDD 40mg. Discussed with patient via telephone, patient reports he previously had to take 2 20mg tablets due to 40mg tablet shortage in past and was never switched back to 40mg tablet. Interested in taking 40mg tablet moving forward, Rx updated.     Layne Zhao, PharmD, BCACP  Specialty Clinical Pharmacist  3/29/2022  09:52 EDT

## 2022-04-18 ENCOUNTER — LAB (OUTPATIENT)
Dept: LAB | Facility: HOSPITAL | Age: 42
End: 2022-04-18

## 2022-04-18 ENCOUNTER — SPECIALTY PHARMACY (OUTPATIENT)
Dept: ENDOCRINOLOGY | Facility: CLINIC | Age: 42
End: 2022-04-18

## 2022-04-18 ENCOUNTER — OFFICE VISIT (OUTPATIENT)
Dept: ENDOCRINOLOGY | Facility: CLINIC | Age: 42
End: 2022-04-18

## 2022-04-18 VITALS
WEIGHT: 249.8 LBS | BODY MASS INDEX: 32.06 KG/M2 | DIASTOLIC BLOOD PRESSURE: 80 MMHG | OXYGEN SATURATION: 97 % | HEART RATE: 86 BPM | SYSTOLIC BLOOD PRESSURE: 142 MMHG | HEIGHT: 74 IN

## 2022-04-18 DIAGNOSIS — E78.2 MIXED HYPERLIPIDEMIA: ICD-10-CM

## 2022-04-18 DIAGNOSIS — R80.9 TYPE 2 DIABETES MELLITUS WITH MICROALBUMINURIA, WITHOUT LONG-TERM CURRENT USE OF INSULIN: ICD-10-CM

## 2022-04-18 DIAGNOSIS — E11.29 TYPE 2 DIABETES MELLITUS WITH MICROALBUMINURIA, WITHOUT LONG-TERM CURRENT USE OF INSULIN: ICD-10-CM

## 2022-04-18 DIAGNOSIS — E11.65 UNCONTROLLED TYPE 2 DIABETES MELLITUS WITH HYPERGLYCEMIA: Primary | ICD-10-CM

## 2022-04-18 LAB
EXPIRATION DATE: ABNORMAL
EXPIRATION DATE: NORMAL
GLUCOSE BLDC GLUCOMTR-MCNC: 315 MG/DL (ref 70–130)
HBA1C MFR BLD: 8.5 %
Lab: ABNORMAL
Lab: NORMAL

## 2022-04-18 PROCEDURE — 82947 ASSAY GLUCOSE BLOOD QUANT: CPT | Performed by: INTERNAL MEDICINE

## 2022-04-18 PROCEDURE — 83036 HEMOGLOBIN GLYCOSYLATED A1C: CPT | Performed by: INTERNAL MEDICINE

## 2022-04-18 PROCEDURE — 82043 UR ALBUMIN QUANTITATIVE: CPT

## 2022-04-18 PROCEDURE — 99214 OFFICE O/P EST MOD 30 MIN: CPT | Performed by: INTERNAL MEDICINE

## 2022-04-18 PROCEDURE — 82570 ASSAY OF URINE CREATININE: CPT

## 2022-04-18 RX ORDER — GLIMEPIRIDE 4 MG/1
8 TABLET ORAL
Qty: 180 TABLET | Refills: 3 | Status: SHIPPED | OUTPATIENT
Start: 2022-04-18

## 2022-04-18 RX ORDER — MULTIVIT WITH MINERALS/LUTEIN
500 TABLET ORAL DAILY
COMMUNITY

## 2022-04-18 RX ORDER — DULAGLUTIDE 3 MG/.5ML
3 INJECTION, SOLUTION SUBCUTANEOUS
Qty: 6 ML | Refills: 3 | Status: SHIPPED | OUTPATIENT
Start: 2022-04-18

## 2022-04-18 RX ORDER — MELATONIN
1000 DAILY
COMMUNITY

## 2022-04-18 NOTE — TELEPHONE ENCOUNTER
Glipizide ER quite expensive on patient's plan at $60/90 days. Discussed with endo provider, Glimepiride much more affordable at $21.94/90 days. Per Dr. Schneider, okay to switch to Glimepiride 8mg daily. Discussed with patient, who was agreeable to plan and expressed understanding. Pending Rx for provider review and signature.     Layne Zhao, PharmD, BCACP  Specialty Clinical Pharmacist  4/18/2022  14:32 EDT

## 2022-04-18 NOTE — ASSESSMENT & PLAN NOTE
Microalbumin was high last visit.  We resumed SGLT-2 inhibitor.  Continue ACE-I.  Check microalbumin again today.

## 2022-04-18 NOTE — ASSESSMENT & PLAN NOTE
Diabetes is improving with treatment.   Titrate up trulicity.  Diabetes will be reassessed in 3 months.

## 2022-04-18 NOTE — PROGRESS NOTES
Specialty Pharmacy Patient Management Program  Endocrinology Reassessment     George Tolentino is a 42 y.o. male with Type 2 Diabetes seen by an Endocrinology provider and enrolled in the Endocrinology Patient Management program offered by [facility].  A follow-up outreach was conducted, including assessment of continued therapy appropriateness, medication adherence, and side effect incidence and management for TRULICITY, STEGLATRO.    Changes to Insurance Coverage or Financial Support  [employer] +  coupons where applicable; no changes     Relevant Past Medical History and Comorbidities  Relevant medical history and concomitant health conditions were discussed with the patient. The patient's chart has been reviewed for relevant past medical history and comorbid health conditions and updated as necessary.   Past Medical History:   Diagnosis Date   • Anxiety    • Depression    • Diabetes mellitus (HCC)    • DM type 2 causing renal disease (HCC)    • GERD (gastroesophageal reflux disease)    • Hypertension    • Mixed hyperlipidemia    • Mood disorder (HCC)    • Obesity     body mass index 30+-obesity   • Type 2 diabetes mellitus, uncontrolled      Social History     Socioeconomic History   • Marital status:    Tobacco Use   • Smoking status: Former Smoker     Types: Cigars     Start date: 2010   • Smokeless tobacco: Never Used   • Tobacco comment: 1 per week   Vaping Use   • Vaping Use: Former   • Substances: THC   • Devices: Pre-filled or refillable cartridge   Substance and Sexual Activity   • Alcohol use: Yes     Comment: 10 drinks/week   • Drug use: Yes     Types: Marijuana   • Sexual activity: Defer       Problem list reviewed by Layne Zhao RPH on 4/18/2022 at  3:01 PM    Allergies  Known allergies and reactions were discussed with the patient. The patient's chart has been reviewed for allergy information and updated as necessary.   Patient has no known  allergies.    Allergies reviewed by Layne Zhao RP on 4/18/2022 at  2:58 PM    Relevant Laboratory Values  A1C Last 3 Results    HGBA1C Last 3 Results 6/14/21 11/11/21 4/18/22   Hemoglobin A1C 7.8 9.4 8.5           Lab Results   Component Value Date    HGBA1C 8.5 04/18/2022     Lab Results   Component Value Date    GLUCOSE 287 (H) 11/11/2021    CALCIUM 9.2 11/11/2021     (L) 11/11/2021    K 4.1 11/11/2021    CO2 24.9 11/11/2021    CL 99 11/11/2021    BUN 11 11/11/2021    CREATININE 0.62 (L) 11/11/2021    EGFRIFNONA 144 11/11/2021    BCR 17.7 11/11/2021    ANIONGAP 9.1 11/11/2021     Lab Results   Component Value Date    CHOL 143 11/11/2021    TRIG 337 (H) 11/11/2021    HDL 31 (L) 11/11/2021    LDL 60 11/11/2021     Microalbumin    Microalbumin 6/14/21 11/11/21   Microalbumin, Urine 29.0 50.9             Current Medication List  This medication list has been reviewed with the patient and evaluated for any interactions or necessary modifications/recommendations, and updated to include all prescription medications, OTC medications, and supplements the patient is currently taking.  This list reflects what is contained in the patient's profile, which has also been marked as reviewed to communicate to other providers it is the most up to date version of the patient's current medication therapy.     Current Outpatient Medications:   •  ascorbic acid (VITAMIN C) 1000 MG tablet, Take 1,000 mg by mouth 2 (Two) Times a Day., Disp: , Rfl:   •  cholecalciferol (VITAMIN D3) 25 MCG (1000 UT) tablet, Take 1,000 Units by mouth Daily., Disp: , Rfl:   •  Dulaglutide (Trulicity) 3 MG/0.5ML solution pen-injector, Inject 3 MG under the skin into the appropriate area as directed Every 7 (Seven) Days., Disp: 6 mL, Rfl: 3  •  Ertugliflozin L-PyroglutamicAc (STEGLATRO) 15 MG tablet, Take 1 tablet by mouth Every Morning, Replaces Farxiga., Disp: 90 tablet, Rfl: 3  •  glimepiride (Amaryl) 4 MG tablet, Take 2 tablets by mouth Every  Morning Before Breakfast. **Replaces Glipizide**, Disp: 180 tablet, Rfl: 3  •  lisinopril (PRINIVIL,ZESTRIL) 40 MG tablet, Take 1 tablet by mouth Daily. **Note strength of tablet changed to 40mg, only take 1 tablet daily**, Disp: 90 tablet, Rfl: 3  •  metFORMIN ER (GLUCOPHAGE-XR) 500 MG 24 hr tablet, Take 2 tablets by mouth 2 (Two) Times a Day., Disp: 360 tablet, Rfl: 3  •  rosuvastatin (CRESTOR) 40 MG tablet, Take 1 tablet by mouth Daily., Disp: 90 tablet, Rfl: 3    Medicines reviewed by Layne Zhao [facility] on 4/18/2022 at  3:01 PM    Drug Interactions  No clinically significant DDI identified     Recommended Medications Assessment  • Aspirin - Not Indicated  • Statin - Currently Taking  • ACEi/ARB - Currently Taking    Adverse Drug Reactions  • Adverse Reactions Experienced: None reported   • Plan for ADR Management: None required     Hospitalizations and Urgent Care Since Last Assessment  • Hospitalizations or Admissions: None   • ED Visits: None   • Urgent Office Visits: None     Adherence and Self-Administration  Adherence related patient's specialty therapy was discussed with the patient. The Adherence segment of this outreach has been reviewed and updated.     Medication Adherence    What concerns does the patient have in regards to their medications: Missed AM dose of glimepiride and metformin on Saturday, as he was away from home and did not have his medications with him. Reports this was the first time he has missed any dose of medication in ~6 months.    Patient reported X missed doses in the last 7 days: 1  Any gaps in refill history greater than 2 weeks in the last 3 months: no  Demonstrates understanding of importance of adherence: yes  Informant: patient  Reliability of informant: reliable  Estimated medication adherence level: %  Reasons for non-adherence: no problems identified  Adherence tools used: patient uses a pill box to manage medications  Support network for adherence: healthcare  provider        • Ongoing or New Barriers to Patient Self-Administration: None   • Methods for Supporting Patient Self-Administration: Patient states always having access to his medications through  Refill Management has made it much easier to be adherent to his medication regimen, with only one missed medication dose in the last ~6 months     Goals of Therapy  Goals related to the patient's specialty therapy was discussed with the patient. The Patient Goals segment of this outreach has been reviewed and updated.    Goals     •  Specialty Pharmacy General Goal (pt-stated)       Patient Stated: Pt hopes to have consistent access to medications moving forward so that he can take everyday as prescribed and reduce A1c.         •  Specialty Pharmacy General Goal       Clinical Goal: Reduce A1c to 6-7%             Quality of Life Assessment   Quality of Life related to the patient's specialty therapy was discussed with the patient. The QOL segment of this outreach has been reviewed and updated.     Quality of Life Assessment  Quality of Life Improvement Scale: No change    Reassessment Plan & Follow-Up  1. Medication Therapy Changes: Increase to Trulicity 3mg once weeky (patient has 8 doses of Trulicity 1.5mg, plans to take 2 doses of 1.5mg weekly until current supply exhausted); Switch Glipizide XL 20mg daily to Glimepiride 8mg daily   2. Additional Plans, Therapy Recommendations, or Therapy Problems to Be Addressed: Recommended switch from glipizide to glimepiride given high cost of glipizide XL ($60/90 days), okay to switch to glimepiride 8mg daily per Dr. Schneider.   3. Pharmacist to perform regular reassessments no more than (6) months from the previous assessment.  4. Care Coordinator to set up future refill outreaches, coordinate prescription delivery, and escalate clinical questions to pharmacist.   5. Patient due for refills of lisinopril, metformin, and glipizide (now glimepiride) at this time. Also states he  will be on his honeymoon from 05/01 to 05/08 and requests steglatro be filled now with others so he does not have to worry about running out of medication on trip, vacation override performed. Will f/u in ~4 weeks to fill Trulicity 3mg pens.     Delivery Questions    Flowsheet Row Most Recent Value   Delivery method FedEx   Delivery address correct? Yes   Preferred delivery time? Anytime   Number of medications in delivery 4   Medication being filled and delivered Steglatro, lisinopril, metformin, glimepiride   Doses left of specialty medications ~2 weeks   Is there any medication that is due not being filled? No   Supplies needed? No supplies needed   Cooler needed? No   Do any medications need mixed or dated? No   Copay form of payment Credit card on file   Additional comments $50.75/90 days total   Questions or concerns for the pharmacist? No        Attestation  I attest that the specialty medication(s) addressed above are appropriate for the patient based on my reassessment.  If the prescribed therapy is at any point deemed not appropriate based on the current or future assessments, a consultation will be initiated with the patient's specialty care provider to determine the best course of action. The revised plan of therapy will be documented along with any additional patient education provided.     Layne Zhao, PharmD, BCACP   4/18/2022  15:10 EDT

## 2022-04-18 NOTE — PROGRESS NOTES
"     Office Note      Date: 2022  Patient Name: George Tolentino  MRN: [id number]  : 1980    Chief Complaint   Patient presents with   • Diabetes       History of Present Illness:   George Tolentino is a 42 y.o. male who presents for Diabetes type 2. Diagnosed in: . Treated in past with oral agents, insulin and trulicity. Current treatments: steglatro, glipizide, metformin and trulicity.  Number of insulin shots per day: none. Checks blood sugar 2 times a week. Has low blood sugar: rare. Aspirin use: No - no indication. Statin use: Yes. ACE-I/ARB use: Yes. Changes in health since last visit: none. Last eye exam: 2022.    Subjective      Diabetic Complications:  Eyes: No  Kidneys: No  Feet: Yes - nephropathy  Heart: No    Diet and Exercise:  Meals per day: 3  Minutes of exercise per week: 0 mins.    Review of Systems:   Review of Systems   Constitutional: Negative.    Cardiovascular: Negative.    Gastrointestinal: Negative.    Endocrine: Negative.        The following portions of the patient's history were reviewed and updated as appropriate: allergies, current medications, past family history, past medical history, past social history, past surgical history and problem list.    Objective       Visit Vitals  /80   Pulse 86   Ht 188 cm (74\")   Wt 113 kg (249 lb 12.8 oz)   SpO2 97%   BMI 32.07 kg/m²       Physical Exam:  Physical Exam  Constitutional:       Appearance: Normal appearance.   Neurological:      Mental Status: He is alert.         Labs:    HbA1c  Lab Results   Component Value Date    HGBA1C 8.5 2022       CMP  Lab Results   Component Value Date    GLUCOSE 287 (H) 2021    BUN 11 2021    CREATININE 0.62 (L) 2021    EGFRIFNONA 144 2021    BCR 17.7 2021    K 4.1 2021    CO2 24.9 2021    CALCIUM 9.2 2021    AST 22 2021    ALT 44 (H) 2021        Lipid Panel  Lab Results   Component Value Date    HDL 31 (L) 2021    LDL 60 " 11/11/2021    TRIG 337 (H) 11/11/2021        TSH  Lab Results   Component Value Date    TSH 1.070 11/11/2021        Hemoglobin A1C  Lab Results   Component Value Date    HGBA1C 8.5 04/18/2022        Microalbumin/Creatinine  Lab Results   Component Value Date    MALBCREANTONETTETIO 1,118.7 11/11/2021    MICROALBUR 50.9 11/11/2021           Assessment / Plan      Assessment & Plan:  Diagnoses and all orders for this visit:    1. Uncontrolled type 2 diabetes mellitus with hyperglycemia (HCC) (Primary)  Assessment & Plan:  Diabetes is improving with treatment.   Titrate up trulicity.  Diabetes will be reassessed in 3 months.    Orders:  -     POC Glucose, Blood  -     POC Glycosylated Hemoglobin (Hb A1C)    2. Type 2 diabetes mellitus with microalbuminuria, without long-term current use of insulin (HCC)  Assessment & Plan:  Microalbumin was high last visit.  We resumed SGLT-2 inhibitor.  Continue ACE-I.  Check microalbumin again today.    Orders:  -     Microalbumin / Creatinine Urine Ratio - Urine, Clean Catch; Future    3. Mixed hyperlipidemia  Assessment & Plan:  Continue statin.      Other orders  -     Dulaglutide (Trulicity) 3 MG/0.5ML solution pen-injector; Inject 0.5 mL under the skin into the appropriate area as directed Every 7 (Seven) Days.  Dispense: 6 mL; Refill: 3      Return in about 3 months (around 7/18/2022) for Recheck with A1c.    Franco Schneider MD   04/18/2022   Immediate family member

## 2022-04-19 LAB
ALBUMIN UR-MCNC: 2.4 MG/DL
CREAT UR-MCNC: 23.2 MG/DL
MICROALBUMIN/CREAT UR: 103.4 MG/G

## 2022-05-13 ENCOUNTER — SPECIALTY PHARMACY (OUTPATIENT)
Dept: PHARMACY | Facility: TELEHEALTH | Age: 42
End: 2022-05-13

## 2022-05-13 NOTE — PROGRESS NOTES
Specialty Pharmacy Refill Coordination Note     Geroge is a 42 y.o. male contacted today regarding refill of Trulicity 3mg/0.5mL.    Reviewed and verified with patient: yes    Specialty medication(s) and dose(s) confirmed: yes    Refill Questions    Flowsheet Row Most Recent Value   Changes to allergies? No   Changes to medications? No   New conditions since last clinic visit No   Unplanned office visit, urgent care, ED, or hospital admission in the last 4 weeks  No   How does patient/caregiver feel medication is working? Very good   Financial problems or insurance changes  No   If yes, describe changes in insurance or financial issues. n/a   Since the previous refill, were any specialty medication doses or scheduled injections missed or delayed?  No   If yes, please provide the amount n/a   Does this patient require a clinical escalation to a pharmacist? No          Delivery Questions    Flowsheet Row Most Recent Value   Delivery method FedEx   Delivery address correct? Yes   Delivery phone number [phone]   Preferred delivery time? Anytime   Number of medications in delivery 1   Medication being filled and delivered Trulicity   Doses left of specialty medications 1 week   Is there any medication that is due not being filled? No   Supplies needed? No supplies needed   Cooler needed? No   Do any medications need mixed or dated? No   Copay form of payment Credit card on file   Questions or concerns for the pharmacist? No   Are any medications first time fills? Yes  [First fill of 3mg dose- had been doubline up on 1.5mg]            Medication Adherence    Adherence tools used: patient uses a pill box to manage medications  Support network for adherence: healthcare provider          Follow-up: 75 day(s)     Juan Chen, Pharmacy Technician  Specialty Pharmacy Technician

## 2022-06-21 ENCOUNTER — SPECIALTY PHARMACY (OUTPATIENT)
Dept: ENDOCRINOLOGY | Facility: CLINIC | Age: 42
End: 2022-06-21

## 2022-06-21 NOTE — PROGRESS NOTES
Specialty Pharmacy Patient Management Program  Endocrinology Refill Outreach      George is a 42 y.o. male contacted today regarding refills of his medication(s).    Specialty medication(s) and dose(s) confirmed:   Other medications being refilled: Crestor     Refill Questions    Flowsheet Row Most Recent Value   Changes to allergies? No   Changes to medications? No   New conditions since last clinic visit No   Unplanned office visit, urgent care, ED, or hospital admission in the last 4 weeks  No   How does patient/caregiver feel medication is working? Very good   Financial problems or insurance changes  No   If yes, describe changes in insurance or financial issues. NA   Since the previous refill, were any specialty medication doses or scheduled injections missed or delayed?  Yes  [Patient missed one dose]   If yes, please provide the amount one dose   Why were doses missed? na   Does this patient require a clinical escalation to a pharmacist? No          Delivery Questions    Flowsheet Row Most Recent Value   Delivery method FedEx   Delivery address correct? Yes   Delivery phone number [phone]   Preferred delivery time? Anytime   Number of medications in delivery 1   Medication being filled and delivered Crestor   Doses left of specialty medications na   Is there any medication that is due not being filled? No   Supplies needed? No supplies needed   Cooler needed? No   Do any medications need mixed or dated? No   Copay form of payment Credit card on file   Additional comments Copay $22.93   Questions or concerns for the pharmacist? No   Explain any questions or concerns for the pharmacist na   Are any medications first time fills? No   Shipment status Delivery complete          Medication Adherence    Adherence tools used: patient uses a pill box to manage medications  Support network for adherence: healthcare provider            Follow-up: 90D     Paloma Johnston, Care Coordinator    Endocrinology  6/21/2022  11:53 EDT

## 2022-07-19 ENCOUNTER — SPECIALTY PHARMACY (OUTPATIENT)
Dept: ENDOCRINOLOGY | Facility: CLINIC | Age: 42
End: 2022-07-19

## 2022-07-19 NOTE — PROGRESS NOTES
Specialty Pharmacy Patient Management Program  Endocrinology Refill Outreach      George is a 42 y.o. male contacted today regarding refills of his medication(s).    Specialty medication(s) and dose(s) confirmed: N/A  Other medications being refilled: Lisinopril, Glimepiride, Metformin    Refill Questions    Flowsheet Row Most Recent Value   Changes to allergies? No   Changes to medications? No   New conditions since last clinic visit No   Unplanned office visit, urgent care, ED, or hospital admission in the last 4 weeks  No   How does patient/caregiver feel medication is working? Very good   Financial problems or insurance changes  No   If yes, describe changes in insurance or financial issues. NA   Does this patient require a clinical escalation to a pharmacist? No          Delivery Questions    Flowsheet Row Most Recent Value   Delivery method FedEx   Delivery address correct? Yes   Delivery phone number [phone]   Preferred delivery time? Anytime   Number of medications in delivery 3   Medication being filled and delivered Glimepiride, Lisinopril, Metformin   Is there any medication that is due not being filled? No   Supplies needed? No supplies needed   Cooler needed? No   Do any medications need mixed or dated? No   Copay form of payment Credit card on file   Additional comments $50.75   Questions or concerns for the pharmacist? No   Are any medications first time fills? No          Medication Adherence    Adherence tools used: patient uses a pill box to manage medications  Support network for adherence: healthcare provider          Follow-up: vivek Hussein PharmD  Clinical Specialty Pharmacist, Endocrinology  7/19/2022  09:30 EDT

## 2022-08-09 ENCOUNTER — SPECIALTY PHARMACY (OUTPATIENT)
Dept: PHARMACY | Facility: HOSPITAL | Age: 42
End: 2022-08-09

## 2022-08-09 NOTE — PROGRESS NOTES
Specialty Pharmacy Patient Management Program  Endocrinology Refill Outreach      George is a 42 y.o. male contacted today regarding refills of his medication(s).    Specialty medication(s) and dose(s) confirmed: Trulicity, Steglatro  Other medications being refilled: na    Refill Questions    Flowsheet Row Most Recent Value   Changes to allergies? No   Changes to medications? No   New conditions since last clinic visit No   Unplanned office visit, urgent care, ED, or hospital admission in the last 4 weeks  No   How does patient/caregiver feel medication is working? Very good   Financial problems or insurance changes  No   If yes, describe changes in insurance or financial issues. na   Since the previous refill, were any specialty medication doses or scheduled injections missed or delayed?  Yes   If yes, please provide the amount One morning dose of Steglatro   Why were doses missed? na   Does this patient require a clinical escalation to a pharmacist? No          Delivery Questions    Flowsheet Row Most Recent Value   Delivery method FedEx   Delivery address correct? Yes   Delivery phone number [phone]   Preferred delivery time? Anytime   Number of medications in delivery 2   Medication being filled and delivered Trulicity, Steglatro   Doses left of specialty medications na   Is there any medication that is due not being filled? No   Supplies needed? No supplies needed   Cooler needed? Yes   Do any medications need mixed or dated? No   Copay form of payment Credit card on file   Additional comments Copay $25.00   Questions or concerns for the pharmacist? No   Explain any questions or concerns for the pharmacist na   Are any medications first time fills? No   Shipment status Cooler packed          Medication Adherence    Adherence tools used: patient uses a pill box to manage medications  Support network for adherence: healthcare provider            Follow-up: 84D     Paloma Johnston, Care Coordinator    Endocrinology  8/9/2022  11:12 EDT

## 2022-09-13 ENCOUNTER — SPECIALTY PHARMACY (OUTPATIENT)
Dept: PHARMACY | Facility: HOSPITAL | Age: 42
End: 2022-09-13

## 2022-09-20 ENCOUNTER — OFFICE VISIT (OUTPATIENT)
Dept: ENDOCRINOLOGY | Facility: CLINIC | Age: 42
End: 2022-09-20

## 2022-09-20 VITALS
HEART RATE: 75 BPM | BODY MASS INDEX: 32.21 KG/M2 | DIASTOLIC BLOOD PRESSURE: 79 MMHG | HEIGHT: 74 IN | WEIGHT: 251 LBS | OXYGEN SATURATION: 97 % | SYSTOLIC BLOOD PRESSURE: 130 MMHG

## 2022-09-20 DIAGNOSIS — R80.9 TYPE 2 DIABETES MELLITUS WITH MICROALBUMINURIA, WITHOUT LONG-TERM CURRENT USE OF INSULIN: ICD-10-CM

## 2022-09-20 DIAGNOSIS — E11.65 UNCONTROLLED TYPE 2 DIABETES MELLITUS WITH HYPERGLYCEMIA: Primary | ICD-10-CM

## 2022-09-20 DIAGNOSIS — E78.2 MIXED HYPERLIPIDEMIA: ICD-10-CM

## 2022-09-20 DIAGNOSIS — E11.29 TYPE 2 DIABETES MELLITUS WITH MICROALBUMINURIA, WITHOUT LONG-TERM CURRENT USE OF INSULIN: ICD-10-CM

## 2022-09-20 LAB
EXPIRATION DATE: ABNORMAL
EXPIRATION DATE: NORMAL
GLUCOSE BLDC GLUCOMTR-MCNC: 136 MG/DL (ref 70–130)
HBA1C MFR BLD: 7.7 %
Lab: ABNORMAL
Lab: NORMAL

## 2022-09-20 PROCEDURE — 82947 ASSAY GLUCOSE BLOOD QUANT: CPT | Performed by: INTERNAL MEDICINE

## 2022-09-20 PROCEDURE — 99213 OFFICE O/P EST LOW 20 MIN: CPT | Performed by: INTERNAL MEDICINE

## 2022-09-20 PROCEDURE — 83036 HEMOGLOBIN GLYCOSYLATED A1C: CPT | Performed by: INTERNAL MEDICINE

## 2022-09-20 NOTE — PROGRESS NOTES
"     Office Note      Date: 2022  Patient Name: George Tolentino  MRN: [id number]  : 1980    Chief Complaint   Patient presents with   • Diabetes       History of Present Illness:   George Tolentino is a 42 y.o. male who presents for Diabetes type 2. Diagnosed in: . Treated in past with oral agents, insulin and trulicity. Current treatments: steglatro, glimepiride, metformin and trulicity.  Number of insulin shots per day: none. Checks blood sugar 2 times a week. Has low blood sugar: rare. Aspirin use: No - no indication. Statin use: Yes. ACE-I/ARB use: Yes. Changes in health since last visit: none. Last eye exam: 2022.    Subjective      Diabetic Complications:  Eyes: No  Kidneys: No  Feet: Yes - nephropathy  Heart: No    Diet and Exercise:  Meals per day: 3  Minutes of exercise per week: 0 mins.    Review of Systems:   Review of Systems   Constitutional: Negative.    Cardiovascular: Negative.    Gastrointestinal: Negative.    Endocrine: Negative.        The following portions of the patient's history were reviewed and updated as appropriate: allergies, current medications, past family history, past medical history, past social history, past surgical history and problem list.    Objective       Visit Vitals  /79   Pulse 75   Ht 188 cm (74\")   Wt 114 kg (251 lb)   SpO2 97%   BMI 32.23 kg/m²       Physical Exam:  Physical Exam  Constitutional:       Appearance: Normal appearance.   Neurological:      Mental Status: He is alert.         Labs:    HbA1c  Lab Results   Component Value Date    HGBA1C 7.7 2022       CMP  Lab Results   Component Value Date    GLUCOSE 287 (H) 2021    BUN 11 2021    CREATININE 0.62 (L) 2021    EGFRIFNONA 144 2021    BCR 17.7 2021    K 4.1 2021    CO2 24.9 2021    CALCIUM 9.2 2021    AST 22 2021    ALT 44 (H) 2021        Lipid Panel  Lab Results   Component Value Date    HDL 31 (L) 2021    LDL 60 " 11/11/2021    TRIG 337 (H) 11/11/2021        TSH  Lab Results   Component Value Date    TSH 1.070 11/11/2021        Hemoglobin A1C  Lab Results   Component Value Date    HGBA1C 7.7 09/20/2022        Microalbumin/Creatinine  Lab Results   Component Value Date    MALBCRERATIO 103.4 04/18/2022    MICROALBUR 2.4 04/18/2022           Assessment / Plan      Assessment & Plan:  Diagnoses and all orders for this visit:    1. Uncontrolled type 2 diabetes mellitus with hyperglycemia (HCC) (Primary)  Assessment & Plan:  Diabetes is improving with treatment.   Continue current treatment regimen.  We discussed titrating up trulicity. However he is having some nausea on the current dose.    Diabetes will be reassessed in 3 months.    Orders:  -     POC Glucose, Blood  -     POC Glycosylated Hemoglobin (Hb A1C)    2. Type 2 diabetes mellitus with microalbuminuria, without long-term current use of insulin (HCC)  Assessment & Plan:  Continue ACE-I and SGLT-2 inhibitor.  Plan to check microalbumin next visit.      3. Mixed hyperlipidemia  Assessment & Plan:  Continue statin.  Plan to check lipids next visit.        Return in about 3 months (around 12/20/2022) for Recheck with A1c, CMP, lipids, TSH, microalbumin, foot exam.    Franco Schneider MD   09/20/2022

## 2022-09-20 NOTE — ASSESSMENT & PLAN NOTE
Diabetes is improving with treatment.   Continue current treatment regimen.  We discussed titrating up trulicity. However he is having some nausea on the current dose.    Diabetes will be reassessed in 3 months.

## 2022-09-21 ENCOUNTER — SPECIALTY PHARMACY (OUTPATIENT)
Dept: ENDOCRINOLOGY | Facility: CLINIC | Age: 42
End: 2022-09-21

## 2022-09-21 DIAGNOSIS — E11.65 UNCONTROLLED TYPE 2 DIABETES MELLITUS WITH HYPERGLYCEMIA: Primary | ICD-10-CM

## 2022-09-21 RX ORDER — PROCHLORPERAZINE 25 MG/1
1 SUPPOSITORY RECTAL
Qty: 1 EACH | Refills: 3 | Status: SHIPPED | OUTPATIENT
Start: 2022-09-21 | End: 2023-03-02

## 2022-09-21 RX ORDER — PROCHLORPERAZINE 25 MG/1
1 SUPPOSITORY RECTAL
Qty: 9 EACH | Refills: 3 | Status: SHIPPED | OUTPATIENT
Start: 2022-09-21 | End: 2023-03-02

## 2022-09-21 NOTE — PROGRESS NOTES
Specialty Pharmacy Patient Management Program  Endocrinology Reassessment     George Tolentino is a 42 y.o. male seen by an Endocrinology provider for Type 2 Diabetes and Hyperlipidemia and enrolled in the Endocrinology Patient Management program offered by [facility].  A follow-up outreach was conducted, including assessment of continued therapy appropriateness, medication adherence, and side effect incidence and management for Trulicity and Steglatro.    Changes to Insurance Coverage or Financial Support  [employer] most recent     Relevant Past Medical History and Comorbidities  Relevant medical history and concomitant health conditions were discussed with the patient. The patient's chart has been reviewed for relevant past medical history and comorbid health conditions and updated as necessary.   Past Medical History:   Diagnosis Date   • Anxiety    • Depression    • Diabetes mellitus (HCC)    • DM type 2 causing renal disease (HCC)    • GERD (gastroesophageal reflux disease)    • Hypertension    • Mixed hyperlipidemia    • Mood disorder (HCC)    • Obesity     body mass index 30+-obesity   • Type 2 diabetes mellitus, uncontrolled      Social History     Socioeconomic History   • Marital status:    Tobacco Use   • Smoking status: Former Smoker     Types: Cigars     Start date: 2010   • Smokeless tobacco: Never Used   • Tobacco comment: 1 per week   Vaping Use   • Vaping Use: Former   • Substances: THC   • Devices: Pre-filled or refillable cartridge   Substance and Sexual Activity   • Alcohol use: Yes     Comment: 10 drinks/week   • Drug use: Yes     Types: Marijuana   • Sexual activity: Defer       Problem list reviewed by Linwood Morales, PharmD on 9/21/2022 at  8:58 AM    Allergies  Known allergies and reactions were discussed with the patient. The patient's chart has been reviewed for allergy information and updated as necessary.   Patient has no known allergies.    Allergies  reviewed by Linwood Morales, PharmD on 9/21/2022 at  8:57 AM    Relevant Laboratory Values  A1C Last 3 Results    HGBA1C Last 3 Results 11/11/21 4/18/22 9/20/22   Hemoglobin A1C 9.4 8.5 7.7           Lab Results   Component Value Date    HGBA1C 7.7 09/20/2022     Lab Results   Component Value Date    GLUCOSE 287 (H) 11/11/2021    CALCIUM 9.2 11/11/2021     (L) 11/11/2021    K 4.1 11/11/2021    CO2 24.9 11/11/2021    CL 99 11/11/2021    BUN 11 11/11/2021    CREATININE 0.62 (L) 11/11/2021    EGFRIFNONA 144 11/11/2021    BCR 17.7 11/11/2021    ANIONGAP 9.1 11/11/2021     Lab Results   Component Value Date    CHOL 143 11/11/2021    TRIG 337 (H) 11/11/2021    HDL 31 (L) 11/11/2021    LDL 60 11/11/2021     Microalbumin    Microalbumin 11/11/21 4/18/22   Microalbumin, Urine 50.9 2.4             Current Medication List  This medication list has been reviewed with the patient and evaluated for any interactions or necessary modifications/recommendations, and updated to include all prescription medications, OTC medications, and supplements the patient is currently taking.  This list reflects what is contained in the patient's profile, which has also been marked as reviewed to communicate to other providers it is the most up to date version of the patient's current medication therapy.     Current Outpatient Medications:   •  ascorbic acid (VITAMIN C) 1000 MG tablet, Take 1,000 mg by mouth 2 (Two) Times a Day., Disp: , Rfl:   •  cholecalciferol (VITAMIN D3) 25 MCG (1000 UT) tablet, Take 1,000 Units by mouth Daily., Disp: , Rfl:   •  Dulaglutide (Trulicity) 3 MG/0.5ML solution pen-injector, Inject 3 MG under the skin into the appropriate area as directed Every 7 (Seven) Days., Disp: 6 mL, Rfl: 3  •  Ertugliflozin L-PyroglutamicAc (STEGLATRO) 15 MG tablet, Take 1 tablet by mouth Every Morning, Replaces Farxiga., Disp: 90 tablet, Rfl: 3  •  glimepiride (Amaryl) 4 MG tablet, Take 2 tablets by mouth Every Morning Before  Breakfast. **Replaces Glipizide**, Disp: 180 tablet, Rfl: 3  •  lisinopril (PRINIVIL,ZESTRIL) 40 MG tablet, Take 1 tablet by mouth Daily. **Note strength of tablet changed to 40mg, only take 1 tablet daily**, Disp: 90 tablet, Rfl: 3  •  metFORMIN ER (GLUCOPHAGE-XR) 500 MG 24 hr tablet, Take 2 tablets by mouth 2 (Two) Times a Day., Disp: 360 tablet, Rfl: 3  •  rosuvastatin (CRESTOR) 40 MG tablet, Take 1 tablet by mouth Daily., Disp: 90 tablet, Rfl: 3    Medicines reviewed by Linwood Morales, PharmD on 9/21/2022 at  8:58 AM    Drug Interactions  No Clinically Significant DDIs Noted at Present Time on Medication Review    Recommended Medications Assessment  • Aspirin - Not Taking Currently  • Statin - Currently Taking   • ACEi/ARB - Currently Taking     Adverse Drug Reactions  • Adverse Reactions Experienced: Nausea   • Plan for ADR Management: Slowly titrate Trulicity dose up and provide supportive care as needed.    Hospitalizations and Urgent Care Since Last Assessment  • Hospitalizations or Admissions: No   • ED Visits: No  • Urgent Office Visits: No     Adherence, Self-Administration, and Current Therapy Problems  Adherence related patient's specialty therapy was discussed with the patient. The Adherence segment of this outreach has been reviewed and updated.     Medication Adherence    Adherence tools used: patient uses a pill box to manage medications  Support network for adherence: healthcare provider        • Ongoing or New Barriers to Patient Self-Administration: None  • Methods for Supporting Patient Self-Administration: N/A     Medication Therapy Recommendations  No medication therapy recommendations to display     Goals of Therapy  Goals related to the patient's specialty therapy was discussed with the patient. The Patient Goals segment of this outreach has been reviewed and updated.    Goals     •  Specialty Pharmacy General Goal (pt-stated)       Patient Stated: Pt hopes to have consistent access to  medications moving forward so that he can take everyday as prescribed and reduce A1c.       •  Specialty Pharmacy General Goal       Clinical Goal: Reduce A1c to 6-7%             Quality of Life Assessment   Quality of Life related to the patient's specialty therapy was discussed with the patient. The QOL segment of this outreach has been reviewed and updated.     Quality of Life Assessment  Quality of Life Improvement Scale: A good deal better    Reassessment Plan & Follow-Up  1. Medication Therapy Changes: None at this time.  Will reassess titrating up Trulicity in 3 months at f/u appt.   2. Additional Plans, Therapy Recommendations, or Therapy Problems to Be Addressed: Patient inquired about obtaining CGM.  PA reqd by insurance.  Will f/u with patient on 9/30 during refill outreach.   3. Pharmacist to perform regular reassessments no more than (6) months from the previous assessment.  4. Care Coordinator to set up future refill outreaches, coordinate prescription delivery, and escalate clinical questions to pharmacist.     Attestation  I attest that the specialty medication(s) addressed above are appropriate for the patient based on my reassessment.  If the prescribed therapy is at any point deemed not appropriate based on the current or future assessments, a consultation will be initiated with the patient's specialty care provider to determine the best course of action. The revised plan of therapy will be documented along with any additional patient education provided.     Linwood Morales, PharmD   Clinical Specialty Pharmacist, Endocrinology  9/21/2022  10:35 EDT

## 2022-09-21 NOTE — TELEPHONE ENCOUNTER
Specialty Pharmacy Patient Management Program  Prescription Request     Patient currently fills medications at  Pharmacy.  Requesting to start Dexcom G6 CGM system.  Pended (sensors and transmitters) for Dr. Schneider to review/approve as appropriate.       Thanks,    Linwood Morales, PharmD, MPH  Clinical Specialty Pharmacist, Endocrinology  9/21/2022  15:20 EDT

## 2022-10-04 ENCOUNTER — SPECIALTY PHARMACY (OUTPATIENT)
Dept: ENDOCRINOLOGY | Facility: CLINIC | Age: 42
End: 2022-10-04

## 2022-10-04 NOTE — PROGRESS NOTES
Specialty Pharmacy Patient Management Program  Endocrinology Refill Outreach      George is a 42 y.o. male contacted today regarding refills of his medication(s).    Specialty medication(s) and dose(s) confirmed: N/A  Other medications being refilled: Crestor, Dexcom Sensors, Dexcom Transmitter    Of note, patient was filling multiple medications at previous cash price.  However, insurance prices are now much less expensive.      Refill Questions    Flowsheet Row Most Recent Value   Changes to allergies? No   Changes to medications? No   New conditions since last clinic visit No   Unplanned office visit, urgent care, ED, or hospital admission in the last 4 weeks  No   How does patient/caregiver feel medication is working? Very good   Financial problems or insurance changes  No   Since the previous refill, were any specialty medication doses or scheduled injections missed or delayed?  No   Does this patient require a clinical escalation to a pharmacist? No          Delivery Questions    Flowsheet Row Most Recent Value   Delivery method [employer]Ex   Delivery address correct? Yes   Delivery phone number [phone]   Preferred delivery time? Anytime   Number of medications in delivery 3   Medication being filled and delivered Crestor, Dexcom Sensors, Dexcom Transmitter   Is there any medication that is due not being filled? No   Supplies needed? No supplies needed   Cooler needed? No   Do any medications need mixed or dated? No   Copay form of payment Credit card on file   Questions or concerns for the pharmacist? No   Are any medications first time fills? Yes  [Dexcom sensors and transmitter]   Shipment status Delivery complete          Medication Adherence    Adherence tools used: patient uses a pill box to manage medications  Support network for adherence: healthcare provider            Follow-up: 10/10/2022 Refill Outreach      Linwood Morales PharmD, MPH  Clinical Specialty Pharmacist, Endocrinology  10/4/2022  12:23  EDT

## 2022-10-11 ENCOUNTER — SPECIALTY PHARMACY (OUTPATIENT)
Dept: ENDOCRINOLOGY | Facility: CLINIC | Age: 42
End: 2022-10-11

## 2022-10-11 NOTE — PROGRESS NOTES
Specialty Pharmacy Patient Management Program  Endocrinology Refill Outreach      George is a 42 y.o. male contacted today regarding refills of his medication(s).    Specialty medication(s) and dose(s) confirmed: na  Other medications being refilled: Glimepiride, Metformin, Lisinopril     Refill Questions    Flowsheet Row Most Recent Value   Changes to allergies? No   Changes to medications? No   New conditions since last clinic visit No   Unplanned office visit, urgent care, ED, or hospital admission in the last 4 weeks  No   How does patient/caregiver feel medication is working? Very good   Financial problems or insurance changes  No   If yes, describe changes in insurance or financial issues. na   Since the previous refill, were any specialty medication doses or scheduled injections missed or delayed?  No   If yes, please provide the amount na   Why were doses missed? na   Does this patient require a clinical escalation to a pharmacist? No          Delivery Questions    Flowsheet Row Most Recent Value   Delivery method FedEx   Delivery address correct? Yes   Delivery phone number [phone]   Preferred delivery time? Anytime   Number of medications in delivery 3   Medication being filled and delivered Glimepiride, Metformin, Lisinopril   Doses left of specialty medications na   Is there any medication that is due not being filled? No   Supplies needed? No supplies needed   Cooler needed? No   Do any medications need mixed or dated? No   Copay form of payment Credit card on file   Additional comments Copay $154.39   Questions or concerns for the pharmacist? No   Explain any questions or concerns for the pharmacist na   Are any medications first time fills? No   Shipment status Delivery complete          Medication Adherence    Adherence tools used: patient uses a pill box to manage medications  Support network for adherence: healthcare provider            Follow-up: 90 Days      Paloma Johnston, Care Coordinator    Endocrinology  10/11/2022  10:35 EDT

## 2022-11-01 ENCOUNTER — SPECIALTY PHARMACY (OUTPATIENT)
Dept: ENDOCRINOLOGY | Facility: CLINIC | Age: 42
End: 2022-11-01

## 2022-11-01 NOTE — PROGRESS NOTES
Specialty Pharmacy Patient Management Program  Endocrinology Refill Outreach      George is a 42 y.o. male contacted today regarding refills of his medication(s).    Specialty medication(s) and dose(s) confirmed: Steglatro and Trulicity   Other medications being refilled: na    Refill Questions    Flowsheet Row Most Recent Value   Changes to allergies? No   Changes to medications? No   New conditions since last clinic visit No   Unplanned office visit, urgent care, ED, or hospital admission in the last 4 weeks  No   How does patient/caregiver feel medication is working? Very good   Financial problems or insurance changes  No   Since the previous refill, were any specialty medication doses or scheduled injections missed or delayed?  No   Does this patient require a clinical escalation to a pharmacist? No          Delivery Questions    Flowsheet Row Most Recent Value   Delivery method FedEx   Delivery address correct? Yes   Delivery phone number [phone]   Preferred delivery time? Anytime   Number of medications in delivery 2   Medication being filled and delivered Steglatro and Trulicity   Is there any medication that is due not being filled? No   Supplies needed? No supplies needed   Cooler needed? Yes   Do any medications need mixed or dated? No   Copay form of payment Credit card on file   Additional comments Copay $25   Questions or concerns for the pharmacist? No   Are any medications first time fills? No   Shipment status Cooler packed          Medication Adherence    Adherence tools used: patient uses a pill box to manage medications  Support network for adherence: healthcare provider            Follow-up: 84 Days     Paloma Johnston, Care Coordinator   Endocrinology  11/1/2022  11:19 EDT

## 2022-12-22 ENCOUNTER — SPECIALTY PHARMACY (OUTPATIENT)
Dept: ENDOCRINOLOGY | Facility: CLINIC | Age: 42
End: 2022-12-22

## 2022-12-22 RX ORDER — ROSUVASTATIN CALCIUM 40 MG/1
40 TABLET, COATED ORAL DAILY
Qty: 90 TABLET | Refills: 3 | Status: SHIPPED | OUTPATIENT
Start: 2022-12-22

## 2022-12-22 NOTE — PROGRESS NOTES
Specialty Pharmacy Patient Management Program  Endocrinology Refill Outreach      George is a 42 y.o. male contacted today regarding refills of his medication(s).    Specialty medication(s) and dose(s) confirmed: None  Other medications being refilled: Crestor    Refill Questions    Flowsheet Row Most Recent Value   Changes to allergies? No   Changes to medications? No   New conditions since last clinic visit No   Unplanned office visit, urgent care, ED, or hospital admission in the last 4 weeks  No   How does patient/caregiver feel medication is working? Very good   Financial problems or insurance changes  No   Since the previous refill, were any specialty medication doses or scheduled injections missed or delayed?  No   Does this patient require a clinical escalation to a pharmacist? No          Delivery Questions    Flowsheet Row Most Recent Value   Delivery method FedEx   Delivery address correct? Yes   Delivery phone number [phone]   Preferred delivery time? Anytime   Number of medications in delivery 1   Medication being filled and delivered Crestor   Is there any medication that is due not being filled? No   Supplies needed? No supplies needed   Cooler needed? No   Do any medications need mixed or dated? No   Copay form of payment Credit card on file   Additional comments Copay $60   Questions or concerns for the pharmacist? No   Are any medications first time fills? No   Shipment status Delivery complete          Medication Adherence    Adherence tools used: patient uses a pill box to manage medications  Support network for adherence: healthcare provider            Follow-up: 1/4/23 Refill Outreach      Linwood Morales PharmD, MPH  Clinical Specialty Pharmacist, Endocrinology  12/22/2022  10:32 EST

## 2023-01-04 ENCOUNTER — TELEPHONE (OUTPATIENT)
Dept: ENDOCRINOLOGY | Facility: CLINIC | Age: 43
End: 2023-01-04
Payer: COMMERCIAL

## 2023-01-04 ENCOUNTER — SPECIALTY PHARMACY (OUTPATIENT)
Dept: ENDOCRINOLOGY | Facility: CLINIC | Age: 43
End: 2023-01-04
Payer: COMMERCIAL

## 2023-01-04 NOTE — PROGRESS NOTES
Specialty Pharmacy Patient Management Program  Endocrinology Refill Outreach      George is a 42 y.o. male contacted today regarding refills of his medication(s).    Specialty medication(s) and dose(s) confirmed: na  Other medications being refilled: Glimepiride, Lisnopril, Metformin    Refill Questions    Flowsheet Row Most Recent Value   Changes to allergies? No   Changes to medications? No   New conditions since last clinic visit No   Unplanned office visit, urgent care, ED, or hospital admission in the last 4 weeks  No   How does patient/caregiver feel medication is working? Very good   Financial problems or insurance changes  No   Since the previous refill, were any specialty medication doses or scheduled injections missed or delayed?  No   Does this patient require a clinical escalation to a pharmacist? No          Delivery Questions    Flowsheet Row Most Recent Value   Delivery method [employer]Ex   Delivery address correct? Yes   Delivery phone number [phone]   Preferred delivery time? Anytime   Number of medications in delivery 3   Medication being filled and delivered Glimepiride, Lisinopril, Metformin   Is there any medication that is due not being filled? No   Supplies needed? No supplies needed   Cooler needed? No   Do any medications need mixed or dated? No   Copay form of payment Credit card on file   Additional comments Copay $41.65   Questions or concerns for the pharmacist? No   Are any medications first time fills? No   Shipment status Delivery complete          Medication Adherence    Adherence tools used: patient uses a pill box to manage medications  Support network for adherence: healthcare provider            Follow-up: 90 Days     Paloma Johnston, Care Coordinator   Endocrinology  1/4/2023  13:20 EST

## 2023-01-18 ENCOUNTER — SPECIALTY PHARMACY (OUTPATIENT)
Dept: ENDOCRINOLOGY | Facility: CLINIC | Age: 43
End: 2023-01-18
Payer: COMMERCIAL

## 2023-01-18 NOTE — PROGRESS NOTES
Specialty Pharmacy Patient Management Program  Endocrinology Refill Outreach      George is a 42 y.o. male contacted today regarding refills of his medication(s).    Specialty medication(s) and dose(s) confirmed: Trulicity  Other medications being refilled: na    Refill Questions    Flowsheet Row Most Recent Value   Changes to allergies? No   Changes to medications? No   New conditions since last clinic visit No   Unplanned office visit, urgent care, ED, or hospital admission in the last 4 weeks  No   How does patient/caregiver feel medication is working? Very good   Financial problems or insurance changes  No   Since the previous refill, were any specialty medication doses or scheduled injections missed or delayed?  No   Does this patient require a clinical escalation to a pharmacist? No          Delivery Questions    Flowsheet Row Most Recent Value   Delivery method [employer]Ex   Delivery address correct? Yes   Delivery phone number [phone]   Preferred delivery time? Anytime   Number of medications in delivery 1   Medication being filled and delivered Trulicity   Is there any medication that is due not being filled? No   Supplies needed? No supplies needed   Cooler needed? Yes   Do any medications need mixed or dated? No   Copay form of payment Credit card on file   Questions or concerns for the pharmacist? No   Are any medications first time fills? No   Shipment status Cooler packed, Delivery complete          Medication Adherence    Adherence tools used: patient uses a pill box to manage medications  Support network for adherence: healthcare provider            Follow-up: 84 Days     Paloma Johnston, Care Coordinator   Endocrinology  1/18/2023  13:12 EST

## 2023-02-09 ENCOUNTER — SPECIALTY PHARMACY (OUTPATIENT)
Dept: ENDOCRINOLOGY | Facility: CLINIC | Age: 43
End: 2023-02-09
Payer: COMMERCIAL

## 2023-02-09 NOTE — PROGRESS NOTES
Specialty Pharmacy Patient Management Program  Endocrinology Refill Outreach      George is a 42 y.o. male contacted today regarding refills of his medication(s).    Specialty medication(s) and dose(s) confirmed: Steglatro   Other medications being refilled: na    Refill Questions    Flowsheet Row Most Recent Value   Changes to allergies? No   Changes to medications? No   New conditions since last clinic visit No   Unplanned office visit, urgent care, ED, or hospital admission in the last 4 weeks  No   How does patient/caregiver feel medication is working? Very good   Financial problems or insurance changes  No   Since the previous refill, were any specialty medication doses or scheduled injections missed or delayed?  No   Does this patient require a clinical escalation to a pharmacist? No          Delivery Questions    Flowsheet Row Most Recent Value   Delivery method [employer]Ex   Delivery address correct? Yes   Delivery phone number [phone]   Preferred delivery time? Anytime   Number of medications in delivery 1   Medication being filled and delivered Steglatro   Is there any medication that is due not being filled? No   Cooler needed? No   Do any medications need mixed or dated? No   Copay form of payment Credit card on file   Additional comments Copay $0   Questions or concerns for the pharmacist? No   Are any medications first time fills? No   Shipment status Delivery complete          Medication Adherence    Adherence tools used: patient uses a pill box to manage medications  Support network for adherence: healthcare provider            Follow-up: 90 Days      Paloma Johnston, Care Coordinator   Endocrinology  2/9/2023  12:02 EST

## 2023-03-02 ENCOUNTER — SPECIALTY PHARMACY (OUTPATIENT)
Dept: ENDOCRINOLOGY | Facility: CLINIC | Age: 43
End: 2023-03-02
Payer: COMMERCIAL

## 2023-03-02 ENCOUNTER — OFFICE VISIT (OUTPATIENT)
Dept: ENDOCRINOLOGY | Facility: CLINIC | Age: 43
End: 2023-03-02
Payer: COMMERCIAL

## 2023-03-02 VITALS
HEIGHT: 74 IN | OXYGEN SATURATION: 96 % | BODY MASS INDEX: 30.8 KG/M2 | DIASTOLIC BLOOD PRESSURE: 68 MMHG | SYSTOLIC BLOOD PRESSURE: 124 MMHG | WEIGHT: 240 LBS | HEART RATE: 79 BPM

## 2023-03-02 DIAGNOSIS — E66.09 CLASS 1 OBESITY DUE TO EXCESS CALORIES WITH SERIOUS COMORBIDITY AND BODY MASS INDEX (BMI) OF 30.0 TO 30.9 IN ADULT: ICD-10-CM

## 2023-03-02 DIAGNOSIS — E11.65 UNCONTROLLED TYPE 2 DIABETES MELLITUS WITH HYPERGLYCEMIA: Primary | ICD-10-CM

## 2023-03-02 DIAGNOSIS — E11.29 TYPE 2 DIABETES MELLITUS WITH MICROALBUMINURIA, WITHOUT LONG-TERM CURRENT USE OF INSULIN: ICD-10-CM

## 2023-03-02 DIAGNOSIS — R80.9 TYPE 2 DIABETES MELLITUS WITH MICROALBUMINURIA, WITHOUT LONG-TERM CURRENT USE OF INSULIN: ICD-10-CM

## 2023-03-02 DIAGNOSIS — E78.2 MIXED HYPERLIPIDEMIA: ICD-10-CM

## 2023-03-02 PROBLEM — E66.811 CLASS 1 OBESITY DUE TO EXCESS CALORIES WITH SERIOUS COMORBIDITY AND BODY MASS INDEX (BMI) OF 30.0 TO 30.9 IN ADULT: Status: ACTIVE | Noted: 2023-03-02

## 2023-03-02 LAB
ALBUMIN SERPL-MCNC: 5.2 G/DL (ref 3.5–5.2)
ALBUMIN UR-MCNC: 11.7 MG/DL
ALBUMIN/GLOB SERPL: 1.7 G/DL
ALP SERPL-CCNC: 58 U/L (ref 39–117)
ALT SERPL W P-5'-P-CCNC: 51 U/L (ref 1–41)
ANION GAP SERPL CALCULATED.3IONS-SCNC: 10.6 MMOL/L (ref 5–15)
AST SERPL-CCNC: 31 U/L (ref 1–40)
BILIRUB SERPL-MCNC: 0.7 MG/DL (ref 0–1.2)
BUN SERPL-MCNC: 19 MG/DL (ref 6–20)
BUN/CREAT SERPL: 25.7 (ref 7–25)
CALCIUM SPEC-SCNC: 10.2 MG/DL (ref 8.6–10.5)
CHLORIDE SERPL-SCNC: 99 MMOL/L (ref 98–107)
CHOLEST SERPL-MCNC: 120 MG/DL (ref 0–200)
CO2 SERPL-SCNC: 25.4 MMOL/L (ref 22–29)
CREAT SERPL-MCNC: 0.74 MG/DL (ref 0.76–1.27)
CREAT UR-MCNC: 61.2 MG/DL
EGFRCR SERPLBLD CKD-EPI 2021: 117.5 ML/MIN/1.73
EXPIRATION DATE: ABNORMAL
EXPIRATION DATE: NORMAL
GLOBULIN UR ELPH-MCNC: 3 GM/DL
GLUCOSE BLDC GLUCOMTR-MCNC: 177 MG/DL (ref 70–130)
GLUCOSE SERPL-MCNC: 160 MG/DL (ref 65–99)
HBA1C MFR BLD: 7.5 %
HDLC SERPL-MCNC: 39 MG/DL (ref 40–60)
LDLC SERPL CALC-MCNC: 58 MG/DL (ref 0–100)
LDLC/HDLC SERPL: 1.43 {RATIO}
Lab: ABNORMAL
Lab: NORMAL
MICROALBUMIN/CREAT UR: 191.2 MG/G
POTASSIUM SERPL-SCNC: 4.4 MMOL/L (ref 3.5–5.2)
PROT SERPL-MCNC: 8.2 G/DL (ref 6–8.5)
SODIUM SERPL-SCNC: 135 MMOL/L (ref 136–145)
TRIGL SERPL-MCNC: 126 MG/DL (ref 0–150)
TSH SERPL DL<=0.05 MIU/L-ACNC: 1.07 UIU/ML (ref 0.27–4.2)
VLDLC SERPL-MCNC: 23 MG/DL (ref 5–40)

## 2023-03-02 PROCEDURE — 99214 OFFICE O/P EST MOD 30 MIN: CPT | Performed by: INTERNAL MEDICINE

## 2023-03-02 PROCEDURE — 83036 HEMOGLOBIN GLYCOSYLATED A1C: CPT | Performed by: INTERNAL MEDICINE

## 2023-03-02 PROCEDURE — 82570 ASSAY OF URINE CREATININE: CPT | Performed by: INTERNAL MEDICINE

## 2023-03-02 PROCEDURE — 80053 COMPREHEN METABOLIC PANEL: CPT | Performed by: INTERNAL MEDICINE

## 2023-03-02 PROCEDURE — 82947 ASSAY GLUCOSE BLOOD QUANT: CPT | Performed by: INTERNAL MEDICINE

## 2023-03-02 PROCEDURE — 80061 LIPID PANEL: CPT | Performed by: INTERNAL MEDICINE

## 2023-03-02 PROCEDURE — 82043 UR ALBUMIN QUANTITATIVE: CPT | Performed by: INTERNAL MEDICINE

## 2023-03-02 PROCEDURE — 84443 ASSAY THYROID STIM HORMONE: CPT | Performed by: INTERNAL MEDICINE

## 2023-03-02 PROCEDURE — 36415 COLL VENOUS BLD VENIPUNCTURE: CPT | Performed by: INTERNAL MEDICINE

## 2023-03-02 RX ORDER — BLOOD-GLUCOSE SENSOR
1 EACH MISCELLANEOUS
Qty: 6 EACH | Refills: 3 | Status: SHIPPED | OUTPATIENT
Start: 2023-03-02

## 2023-03-02 NOTE — PROGRESS NOTES
Specialty Pharmacy Patient Management Program  Endocrinology Reassessment     George Tolentino is a 42 y.o. male seen by an Endocrinology provider for Type 2 Diabetes and enrolled in the Endocrinology Patient Management program offered by [facility].  A follow-up outreach was conducted, including assessment of continued therapy appropriateness, medication adherence, and side effect incidence and management for Trulicity and Steglatro.    Changes to Insurance Coverage or Financial Support  No changes at this time    Relevant Past Medical History and Comorbidities  Relevant medical history and concomitant health conditions were discussed with the patient. The patient's chart has been reviewed for relevant past medical history and comorbid health conditions and updated as necessary.   Past Medical History:   Diagnosis Date   • Anxiety    • Depression    • Diabetes mellitus (HCC)    • DM type 2 causing renal disease (HCC)    • GERD (gastroesophageal reflux disease)    • Hypertension    • Mixed hyperlipidemia    • Mood disorder (HCC)    • Obesity     body mass index 30+-obesity   • Type 2 diabetes mellitus, uncontrolled      Social History     Socioeconomic History   • Marital status:    Tobacco Use   • Smoking status: Former     Types: Cigars     Start date: 2010   • Smokeless tobacco: Never   • Tobacco comments:     1 per week   Vaping Use   • Vaping Use: Former   • Substances: THC   • Devices: Pre-filled or refillable cartridge   Substance and Sexual Activity   • Alcohol use: Yes     Comment: 10 drinks/week   • Drug use: Yes     Types: Marijuana   • Sexual activity: Defer       Problem list reviewed by Linwood Morales, PharmD on 3/2/2023 at  9:10 AM    Allergies  Known allergies and reactions were discussed with the patient. The patient's chart has been reviewed for allergy information and updated as necessary.   No Known Allergies    Allergies reviewed by Linwood Morales, PharmD on  3/2/2023 at  9:09 AM    Relevant Laboratory Values  A1C Last 3 Results    HGBA1C Last 3 Results 4/18/22 9/20/22 3/2/23   Hemoglobin A1C 8.5 7.7 7.5           Lab Results   Component Value Date    HGBA1C 7.5 03/02/2023     Lab Results   Component Value Date    GLUCOSE 287 (H) 11/11/2021    CALCIUM 9.2 11/11/2021     (L) 11/11/2021    K 4.1 11/11/2021    CO2 24.9 11/11/2021    CL 99 11/11/2021    BUN 11 11/11/2021    CREATININE 0.62 (L) 11/11/2021    EGFRIFNONA 144 11/11/2021    BCR 17.7 11/11/2021    ANIONGAP 9.1 11/11/2021     Lab Results   Component Value Date    CHOL 143 11/11/2021    TRIG 337 (H) 11/11/2021    HDL 31 (L) 11/11/2021    LDL 60 11/11/2021     Microalbumin    Microalbumin 4/18/22   Microalbumin, Urine 2.4           Current Medication List  This medication list has been reviewed with the patient and evaluated for any interactions or necessary modifications/recommendations, and updated to include all prescription medications, OTC medications, and supplements the patient is currently taking.  This list reflects what is contained in the patient's profile, which has also been marked as reviewed to communicate to other providers it is the most up to date version of the patient's current medication therapy.     Current Outpatient Medications:   •  ascorbic acid (VITAMIN C) 1000 MG tablet, Take 500 mg by mouth Daily., Disp: , Rfl:   •  cholecalciferol (VITAMIN D3) 25 MCG (1000 UT) tablet, Take 1 tablet by mouth Daily., Disp: , Rfl:   •  Continuous Blood Gluc Sensor (FreeStyle Jeramy 3 Sensor) misc, 1 each Every 14 (Fourteen) Days., Disp: 6 each, Rfl: 3  •  Dulaglutide (Trulicity) 3 MG/0.5ML solution pen-injector, Inject 3 MG under the skin into the appropriate area as directed Every 7 (Seven) Days., Disp: 6 mL, Rfl: 3  •  Ertugliflozin L-PyroglutamicAc (STEGLATRO) 15 MG tablet, Take 1 tablet by mouth Every Morning, Replaces Farxiga., Disp: 90 tablet, Rfl: 3  •  glimepiride (Amaryl) 4 MG tablet, Take 2  tablets by mouth Every Morning Before Breakfast. **Replaces Glipizide**, Disp: 180 tablet, Rfl: 3  •  lisinopril (PRINIVIL,ZESTRIL) 40 MG tablet, Take 1 tablet by mouth Daily. **Note strength of tablet changed to 40mg, only take 1 tablet daily**, Disp: 90 tablet, Rfl: 3  •  metFORMIN ER (GLUCOPHAGE-XR) 500 MG 24 hr tablet, Take 2 tablets by mouth 2 (Two) Times a Day., Disp: 360 tablet, Rfl: 3  •  rosuvastatin (CRESTOR) 40 MG tablet, Take 1 tablet by mouth Daily., Disp: 90 tablet, Rfl: 3    Medicines reviewed by Linwood Morales, PharmD on 3/2/2023 at  9:09 AM  Medicines reviewed by Linwood Morales, PharmD on 3/2/2023 at  9:10 AM    Drug Interactions  No Clinically Significant DDIs Were Identified at Present Time Upon Marking Medications Reviewed    Recommended Medications Assessment  • Aspirin: Not Taking Currently  • Statin: Currently Taking   • ACEi/ARB: Currently Taking     Adverse Drug Reactions  • Adverse Reactions Experienced: Continued mild nausea day after Trulicity.  Adequately managed with supportive care measures.   • Plan for ADR Management: Continue supportive care measures and may consider recommending Zofran PRN if continues and/or worsens.    Hospitalizations and Urgent Care Since Last Assessment  • ED Visits, Admissions, or Hospitalizations: None  • Urgent Office Visits: None    Adherence, Self-Administration, and Current Therapy Problems  Adherence related to the patient's specialty therapy was discussed with the patient. The Adherence segment of this outreach has been reviewed and updated.     Adherence Questions  Medication(s) assessed: Bandar Sanchez  On average, how many doses/injections does the patient miss per month?: 0  What are the identified reasons for non-adherence or missed doses? : no problems identfied  What is the estimated medication adherence level?: %  Based on the patient/caregiver response and refill history, does this patient require an MTP to track adherence  improvements?: no    • Ongoing or New Barriers to Patient Self-Administration: No known barriers at this time  • Methods for Supporting Patient Self-Administration: Continue current measures and incorporate CGM for real-time feedback/awareness.     Medication Therapy Recommendations  No medication therapy recommendations to display    Goals of Therapy  Goals related to the patient's specialty therapy were discussed with the patient. The Patient Goals segment of this outreach has been reviewed and updated.   Goals     •  Specialty Pharmacy General Goal (pt-stated)       Patient Stated: Pt hopes to have consistent access to medications moving forward so that he can take everyday as prescribed and reduce A1c.     3/2/2023:  Access and adherence are much better for target medications but not for CGM.  Changed to Jeramy 3 today which decreased copay by ~50%.  Pt receiving 3 month supply.  A1c improving with target med adherence and expect to improve more with real-time CGM feedback.  CR      •  Specialty Pharmacy General Goal       Clinical Goal: Reduce A1c to 6-7%    3/2/2023:  A1c decreased from 7.7% to 7.5% since last visit.  Adherence has been much better but CGM access has been a barrier.  Hope to see this number continue to fall with continuous usage of CGM.  CR            Quality of Life Assessment   Quality of Life related to the patient's enrollment in the patient management program and services provided was discussed with the patient. The QOL segment of this outreach has been reviewed and updated.    Quality of Life Improvement Scale: A good deal better    Reassessment Plan & Follow-Up  1. Medication Therapy Changes: No target medication changes at this time.  Changed from Dexcom to Jeramy 3.  Provided education and assisted patient in setting up device.    2. Additional Plans, Therapy Recommendations, or Therapy Problems to Be Addressed: Continue current measures and incorporate Jeramy 3 into daily routine.  Pt  has lost weight and A1c trending down.  Continue progress.   3. Pharmacist to perform regular assessments no more than (6) months from the previous assessment.  4. Care Coordinator to set up future refill outreaches, coordinate prescription delivery, and escalate clinical questions to pharmacist.      Attestation  I attest that the specialty medications addressed above are appropriate for the patient based on my reassessment. If the prescribed therapy is at any point deemed not appropriate based on the current or future assessments, a consultation will be initiated with the patient's specialty care provider to determine the best course of action. The revised plan of therapy will be documented along with any required assessments and/or additional patient education provided.       Linwood Morales, PharmD, MPH  Clinical Specialty Pharmacist, Endocrinology  3/2/2023  09:17 EST

## 2023-03-02 NOTE — PROGRESS NOTES
"     Office Note      Date: 2023  Patient Name: George Tolentino  MRN: [id number]  : 1980    Chief Complaint   Patient presents with   • Diabetes     Type II       History of Present Illness:   George Tolentino is a 42 y.o. male who presents for Diabetes type 2. Diagnosed in: . Treated in past with oral agents, insulin and trulicity. Current treatments: steglatro, glimepiride, metformin and trulicity.  Number of insulin shots per day: none. Checks blood sugar 2 times a week. Has low blood sugar: rare. Aspirin use: No - no indication. Statin use: Yes. ACE-I/ARB use: Yes. Changes in health since last visit: none. Last eye exam: 2022.    Subjective      Diabetic Complications:  Eyes: No  Kidneys: Yes - nephropathy  Feet: No  Heart: No    Diet and Exercise:  Meals per day: 3  Minutes of exercise per week: 0 mins.    Review of Systems:   Review of Systems   Constitutional: Negative.    Cardiovascular: Negative.    Gastrointestinal: Negative.    Endocrine: Negative.        The following portions of the patient's history were reviewed and updated as appropriate: allergies, current medications, past family history, past medical history, past social history, past surgical history and problem list.    Objective       Visit Vitals  /68 (BP Location: Left arm, Patient Position: Sitting, Cuff Size: Adult)   Pulse 79   Ht 188 cm (74\")   Wt 109 kg (240 lb)   SpO2 96%   BMI 30.81 kg/m²       Physical Exam:  Physical Exam  Constitutional:       Appearance: Normal appearance.   Cardiovascular:      Pulses:           Dorsalis pedis pulses are 2+ on the right side and 2+ on the left side.        Posterior tibial pulses are 2+ on the right side and 2+ on the left side.   Feet:      Right foot:      Protective Sensation: 5 sites tested. 5 sites sensed.      Skin integrity: Skin integrity normal.      Toenail Condition: Right toenails are normal.      Left foot:      Protective Sensation: 5 sites tested. 5 sites " sensed.      Skin integrity: Skin integrity normal.      Toenail Condition: Left toenails are normal.   Neurological:      Mental Status: He is alert.         Labs:    HbA1c  Lab Results   Component Value Date    HGBA1C 7.5 03/02/2023       CMP  Lab Results   Component Value Date    GLUCOSE 287 (H) 11/11/2021    BUN 11 11/11/2021    CREATININE 0.62 (L) 11/11/2021    EGFRIFNONA 144 11/11/2021    BCR 17.7 11/11/2021    K 4.1 11/11/2021    CO2 24.9 11/11/2021    CALCIUM 9.2 11/11/2021    AST 22 11/11/2021    ALT 44 (H) 11/11/2021        Lipid Panel  Lab Results   Component Value Date    HDL 31 (L) 11/11/2021    LDL 60 11/11/2021    TRIG 337 (H) 11/11/2021        TSH  Lab Results   Component Value Date    TSH 1.070 11/11/2021        Hemoglobin A1C  Lab Results   Component Value Date    HGBA1C 7.5 03/02/2023        Microalbumin/Creatinine  Lab Results   Component Value Date    MALBCRERATIO 103.4 04/18/2022    MICROALBUR 2.4 04/18/2022           Assessment / Plan      Assessment & Plan:  Diagnoses and all orders for this visit:    1. Uncontrolled type 2 diabetes mellitus with hyperglycemia (HCC) (Primary)  Assessment & Plan:  Diabetes is improving with treatment.  A1c slightly better.  He has lost some weight.  He says glucose levels were better while on DexCom.  We discussed potential of using FreeStyle Jeramy 3.  Continue current treatment regimen.  Diabetes will be reassessed in 3 months.    Orders:  -     POC Glucose, Blood  -     POC Glycosylated Hemoglobin (Hb A1C)  -     Comprehensive Metabolic Panel; Future  -     Lipid Panel; Future  -     Microalbumin / Creatinine Urine Ratio - Urine, Clean Catch; Future  -     TSH; Future    2. Type 2 diabetes mellitus with microalbuminuria, without long-term current use of insulin (HCC)  Assessment & Plan:  Continue ACE-I and SGLT-2 inhibitor.  Check microalbumin today.      3. Mixed hyperlipidemia  Assessment & Plan:  Continue statin.  Check lipids today.      4. Class 1  obesity due to excess calories with serious comorbidity and body mass index (BMI) of 30.0 to 30.9 in adult  Assessment & Plan:  We discussed potential of bariatric surgery.  He would like to meet with the surgeons to discuss this further.    Orders:  -     Ambulatory Referral to Bariatric Surgery    Other orders  -     Continuous Blood Gluc Sensor (FreeStyle Jeramy 3 Sensor) misc; 1 each Every 14 (Fourteen) Days.  Dispense: 6 each; Refill: 3    Current Outpatient Medications   Medication Instructions   • ascorbic acid (VITAMIN C) 1,000 mg, Oral, 2 Times Daily   • cholecalciferol (VITAMIN D3) 1,000 Units, Oral, Daily   • Continuous Blood Gluc Sensor (FreeStyle Jeramy 3 Sensor) misc 1 each, Does not apply, Every 14 Days   • Dulaglutide (Trulicity) 3 MG/0.5ML solution pen-injector Inject 3 MG under the skin into the appropriate area as directed Every 7 (Seven) Days.   • Ertugliflozin L-PyroglutamicAc (STEGLATRO) 15 MG tablet Take 1 tablet by mouth Every Morning, Replaces Farxiga.   • glimepiride (AMARYL) 8 mg, Oral, Every Morning Before Breakfast, **Replaces Glipizide**   • lisinopril (PRINIVIL,ZESTRIL) 40 mg, Oral, Daily, **Note strength of tablet changed to 40mg, only take 1 tablet daily**   • metFORMIN ER (GLUCOPHAGE-XR) 1,000 mg, Oral, 2 Times Daily   • rosuvastatin (CRESTOR) 40 mg, Oral, Daily      Return in about 3 months (around 6/2/2023) for Recheck with A1c.    Franco Schneider MD   03/02/2023

## 2023-03-02 NOTE — ASSESSMENT & PLAN NOTE
Diabetes is improving with treatment.  A1c slightly better.  He has lost some weight.  He says glucose levels were better while on DexCom.  We discussed potential of using FreeStyle Jeramy 3.  Continue current treatment regimen.  Diabetes will be reassessed in 3 months.

## 2023-03-02 NOTE — ASSESSMENT & PLAN NOTE
We discussed potential of bariatric surgery.  He would like to meet with the surgeons to discuss this further.

## 2023-03-02 NOTE — PROGRESS NOTES
Specialty Pharmacy Patient Management Program  Endocrinology Refill Outreach      George is a 42 y.o. male contacted today regarding refills of his medication(s).    Specialty medication(s) and dose(s) confirmed: Steglatro, Trulicity  Other medication(s) being refilled: None    Refill Questions    Flowsheet Row Most Recent Value   Changes to allergies? No   Changes to medications? No   New conditions since last clinic visit No   Unplanned office visit, urgent care, ED, or hospital admission in the last 4 weeks  No   How does patient/caregiver feel medication is working? Very good   Financial problems or insurance changes  No   Since the previous refill, were any specialty medication doses or scheduled injections missed or delayed?  No   Does this patient require a clinical escalation to a pharmacist? No          Delivery Questions    Flowsheet Row Most Recent Value   Delivery method [employer]Ex   Delivery address correct? Yes   Delivery phone number [phone]   Preferred delivery time? Anytime   Number of medications in delivery 1   Medication being filled and delivered Jeramy 3   Is there any medication that is due not being filled? No   Supplies needed? No supplies needed   Cooler needed? No   Do any medications need mixed or dated? No   Copay form of payment Credit card on file   Additional comments Copay $224.99   Questions or concerns for the pharmacist? No   Are any medications first time fills? No   Shipment status Delivery complete          Medication Adherence    Adherence tools used: patient uses a pill box to manage medications  Support network for adherence: healthcare provider           Follow-Up: 3/11 Crestor RO      Linwood Andrew, PharmD, MPH  Clinical Specialty Pharmacist, Endocrinology  3/2/2023  09:17 EST

## 2023-03-20 ENCOUNTER — SPECIALTY PHARMACY (OUTPATIENT)
Dept: ENDOCRINOLOGY | Facility: CLINIC | Age: 43
End: 2023-03-20
Payer: COMMERCIAL

## 2023-03-20 NOTE — PROGRESS NOTES
" Specialty Pharmacy Patient Management Program  Endocrinology Refill Outreach      George \"Maggie" is a 42 y.o. male contacted today regarding refills of his medication(s).    Specialty medication(s) and dose(s) confirmed: na  Other medications being refilled: Crestor    Refill Questions    Flowsheet Row Most Recent Value   Changes to allergies? No   Changes to medications? No   New conditions since last clinic visit No   Unplanned office visit, urgent care, ED, or hospital admission in the last 4 weeks  No   How does patient/caregiver feel medication is working? Very good   Financial problems or insurance changes  No   Since the previous refill, were any specialty medication doses or scheduled injections missed or delayed?  No   Does this patient require a clinical escalation to a pharmacist? No          Delivery Questions    Flowsheet Row Most Recent Value   Delivery method [employer]Ex   Delivery address correct? Yes   Delivery phone number [phone]   Preferred delivery time? Anytime   Number of medications in delivery 1   Medication being filled and delivered Crestor   Is there any medication that is due not being filled? No   Supplies needed? No supplies needed   Cooler needed? No   Do any medications need mixed or dated? No   Copay form of payment Credit card on file   Additional comments Copay $19.69   Questions or concerns for the pharmacist? No   Are any medications first time fills? No   Shipment status Delivery complete          Medication Adherence    Adherence tools used: patient uses a pill box to manage medications  Support network for adherence: healthcare provider            Follow-up: 90 Days     Paloma Johnston, Care Coordinator   Endocrinology  3/20/2023  12:39 EDT          "

## 2023-04-11 RX ORDER — GLIMEPIRIDE 4 MG/1
8 TABLET ORAL
Qty: 180 TABLET | Refills: 3 | Status: SHIPPED | OUTPATIENT
Start: 2023-04-11

## 2023-04-11 RX ORDER — DULAGLUTIDE 3 MG/.5ML
3 INJECTION, SOLUTION SUBCUTANEOUS
Qty: 6 ML | Refills: 4 | Status: SHIPPED | OUTPATIENT
Start: 2023-04-11

## 2023-04-11 RX ORDER — METFORMIN HYDROCHLORIDE 500 MG/1
1000 TABLET, EXTENDED RELEASE ORAL 2 TIMES DAILY
Qty: 360 TABLET | Refills: 3 | Status: SHIPPED | OUTPATIENT
Start: 2023-04-11

## 2023-04-11 RX ORDER — LISINOPRIL 40 MG/1
40 TABLET ORAL DAILY
Qty: 90 TABLET | Refills: 3 | Status: SHIPPED | OUTPATIENT
Start: 2023-04-11

## 2023-04-11 NOTE — TELEPHONE ENCOUNTER
Specialty Pharmacy Patient Management Program  Prescription Refill Request     Patient currently fills medications at  Pharmacy. Needing refill(s) on the following:      Requested Prescriptions     Pending Prescriptions Disp Refills   • Ertugliflozin L-PyroglutamicAc (STEGLATRO) 15 MG tablet 90 tablet 3     Sig: Take 1 tablet by mouth Every Morning.   • Dulaglutide (Trulicity) 3 MG/0.5ML solution pen-injector 6 mL 4     Sig: Inject 3 MG under the skin into the appropriate area as directed Every 7 (Seven) Days.   • glimepiride (Amaryl) 4 MG tablet 180 tablet 3     Sig: Take 2 tablets by mouth Every Morning Before Breakfast. **Replaces Glipizide**   • metFORMIN ER (GLUCOPHAGE-XR) 500 MG 24 hr tablet 360 tablet 3     Sig: Take 2 tablets by mouth 2 (Two) Times a Day.   • lisinopril (PRINIVIL,ZESTRIL) 40 MG tablet 90 tablet 3     Sig: Take 1 tablet by mouth Daily. **Note strength of tablet changed to 40mg, only take 1 tablet daily**     Pended for Dr. Schneider to review, and approve if appropriate.       Linwood Morales, PharmD, MPH  Clinical Specialty Pharmacist, Endocrinology  4/11/2023  12:36 EDT

## 2023-04-12 ENCOUNTER — SPECIALTY PHARMACY (OUTPATIENT)
Dept: ENDOCRINOLOGY | Facility: CLINIC | Age: 43
End: 2023-04-12

## 2023-04-13 ENCOUNTER — SPECIALTY PHARMACY (OUTPATIENT)
Dept: ENDOCRINOLOGY | Facility: CLINIC | Age: 43
End: 2023-04-13
Payer: COMMERCIAL

## 2023-04-13 NOTE — PROGRESS NOTES
" Specialty Pharmacy Patient Management Program  Endocrinology Refill Outreach      George \"Maggie" is a 43 y.o. male contacted today regarding refills of his medication(s).    Specialty medication(s) and dose(s) confirmed: na  Other medications being refilled: Glimepiride and Metformin     Refill Questions    Flowsheet Row Most Recent Value   Changes to allergies? No   Changes to medications? No   New conditions since last clinic visit No   Unplanned office visit, urgent care, ED, or hospital admission in the last 4 weeks  No   How does patient/caregiver feel medication is working? Very good   Financial problems or insurance changes  No   Since the previous refill, were any specialty medication doses or scheduled injections missed or delayed?  No   Does this patient require a clinical escalation to a pharmacist? No          Delivery Questions    Flowsheet Row Most Recent Value   Delivery method FedEx   Delivery address correct? Yes   Delivery phone number [phone]   Preferred delivery time? Anytime   Number of medications in delivery 2   Medication being filled and delivered Glimepiride and Lisinopril   Is there any medication that is due not being filled? No   Supplies needed? No supplies needed   Cooler needed? No   Do any medications need mixed or dated? No   Copay form of payment Credit card on file   Additional comments Copay $25.89   Questions or concerns for the pharmacist? No   Are any medications first time fills? No   Shipment status Delivery complete          Medication Adherence    Adherence tools used: patient uses a pill box to manage medications  Support network for adherence: healthcare provider            Follow-up: 90 Days      Paloma Johnston, Care Coordinator   Endocrinology  4/13/2023  10:42 EDT          "

## 2023-05-16 ENCOUNTER — TELEPHONE (OUTPATIENT)
Dept: ENDOCRINOLOGY | Facility: CLINIC | Age: 43
End: 2023-05-16
Payer: COMMERCIAL

## 2023-05-16 NOTE — TELEPHONE ENCOUNTER
Patient called the office, he is wanting the pharmacist to call him back. Stated he left a message last week and had not heard back. Thank you!

## 2023-05-18 ENCOUNTER — SPECIALTY PHARMACY (OUTPATIENT)
Dept: ENDOCRINOLOGY | Facility: CLINIC | Age: 43
End: 2023-05-18

## 2023-05-18 ENCOUNTER — SPECIALTY PHARMACY (OUTPATIENT)
Dept: ENDOCRINOLOGY | Facility: CLINIC | Age: 43
End: 2023-05-18
Payer: COMMERCIAL

## 2023-05-18 RX ORDER — DAPAGLIFLOZIN 10 MG/1
10 TABLET, FILM COATED ORAL DAILY
Qty: 90 TABLET | Refills: 3 | Status: SHIPPED | OUTPATIENT
Start: 2023-05-18

## 2023-05-18 RX ORDER — SEMAGLUTIDE 2.68 MG/ML
2 INJECTION, SOLUTION SUBCUTANEOUS WEEKLY
Qty: 9 ML | Refills: 4 | Status: SHIPPED | OUTPATIENT
Start: 2023-05-18

## 2023-05-18 NOTE — TELEPHONE ENCOUNTER
Specialty Pharmacy Patient Management Program  Prescription Refill Request     Patient currently fills medications at  Pharmacy.  New insurance covers Farxiga for $0 copay with coupon.  Requesting prescription for the following:      Requested Prescriptions     Pending Prescriptions Disp Refills   • dapagliflozin Propanediol (Farxiga) 10 MG tablet 90 tablet 3     Sig: Take 10 mg by mouth Daily.     Pended for Dr. Schneider to review, and approve if appropriate.       Linwood Morales, PharmD, MPH  Clinical Specialty Pharmacist, Endocrinology  5/18/2023  11:53 EDT

## 2023-05-18 NOTE — PROGRESS NOTES
" Specialty Pharmacy Patient Management Program  Endocrinology Refill Outreach      George \"Maggie" is a 43 y.o. male contacted today regarding refills of his medication(s).    Specialty medication(s) and dose(s) confirmed: Farxiga, Ozempic  Other medication(s) being refilled: None    Refill Questions    Flowsheet Row Most Recent Value   Changes to allergies? No   Changes to medications? No   New conditions since last clinic visit No   Unplanned office visit, urgent care, ED, or hospital admission in the last 4 weeks  No   How does patient/caregiver feel medication is working? Very good   Financial problems or insurance changes  No   Since the previous refill, were any specialty medication doses or scheduled injections missed or delayed?  No   Does this patient require a clinical escalation to a pharmacist? No        Delivery Questions    Flowsheet Row Most Recent Value   Delivery method [employer]Ex   Delivery address correct? Yes   Delivery phone number [phone]   Preferred delivery time? Anytime   Number of medications in delivery 2   Medication being filled and delivered Farxiga, Ozempic   Is there any medication that is due not being filled? No   Supplies needed? No supplies needed   Cooler needed? Yes   Do any medications need mixed or dated? No   Copay form of payment Credit card on file   Additional comments Copay $25   Questions or concerns for the pharmacist? No   Are any medications first time fills? No   Shipment status Cooler packed, Delivery complete          Medication Adherence    Adherence tools used: patient uses a pill box to manage medications  Support network for adherence: healthcare provider         Follow-Up: 28natan Morales, PharmD, MPH  Clinical Specialty Pharmacist, Endocrinology  5/18/2023  15:47 EDT    "

## 2023-05-18 NOTE — TELEPHONE ENCOUNTER
Specialty Pharmacy Patient Management Program  Prescription Refill Request     Patient currently fills medications at  Pharmacy. Needing refill(s) on the following:      Requested Prescriptions     Pending Prescriptions Disp Refills   • Semaglutide, 2 MG/DOSE, (Ozempic, 2 MG/DOSE,) 8 MG/3ML solution pen-injector 9 mL 4     Sig: Inject 2 mg under the skin into the appropriate area as directed 1 (One) Time Per Week.     Pended for Dr. Schneider to review, and approve if appropriate.       Linwood Morales, PharmD, MPH  Clinical Specialty Pharmacist, Endocrinology  5/18/2023  14:14 EDT

## 2023-05-18 NOTE — PROGRESS NOTES
Specialty Pharmacy Patient Management Program  Endocrinology Initial Assessment     George Tolentino is a male seen by an Endocrinology provider for Type 2 Diabetes and enrolled in the Endocrinology Patient Management program offered by [facility].  An initial outreach was conducted, including assessment of therapy appropriateness and specialty medication education for Farxiga and Ozempic. The patient was introduced to services offered by [facility], including: regular assessments, refill coordination, curbside pick-up or mail order delivery options, prior authorization maintenance, and financial assistance programs as applicable. The patient was also provided with contact information for the pharmacy team.     Insurance Coverage & Financial Support  [employer] - New after starting current job recently      Relevant Past Medical History and Comorbidities  Relevant medical history and concomitant health conditions were discussed with the patient. The patient's chart has been reviewed for relevant past medical history and comorbid conditions and updated as necessary.  Past Medical History:   Diagnosis Date   • Anxiety    • Depression    • Diabetes mellitus    • DM type 2 causing renal disease    • GERD (gastroesophageal reflux disease)    • Hypertension    • Mixed hyperlipidemia    • Mood disorder    • Obesity     body mass index 30+-obesity   • Type 2 diabetes mellitus, uncontrolled      Social History     Socioeconomic History   • Marital status:    Tobacco Use   • Smoking status: Former     Types: Cigars     Start date: 2010   • Smokeless tobacco: Never   • Tobacco comments:     1 per week   Vaping Use   • Vaping Use: Former   • Substances: THC   • Devices: Pre-filled or refillable cartridge   Substance and Sexual Activity   • Alcohol use: Yes     Comment: 10 drinks/week   • Drug use: Yes     Types: Marijuana   • Sexual activity: Defer       Problem list  reviewed by Linwood Morales, PharmD on 5/18/2023 at  3:40 PM    Allergies  Known allergies and reactions were discussed with the patient. The patient's chart has been reviewed for  allergy information and updated as necessary.   No Known Allergies    Allergies reviewed by Linwood Morales, PharmD on 5/18/2023 at  3:39 PM    Relevant Laboratory Values  A1C Last 3 Results        9/20/2022    14:55 3/2/2023    08:06   HGBA1C Last 3 Results   Hemoglobin A1C 7.7   7.5       Lab Results   Component Value Date    HGBA1C 7.5 03/02/2023     Lab Results   Component Value Date    GLUCOSE 160 (H) 03/02/2023    CALCIUM 10.2 03/02/2023     (L) 03/02/2023    K 4.4 03/02/2023    CO2 25.4 03/02/2023    CL 99 03/02/2023    BUN 19 03/02/2023    CREATININE 0.74 (L) 03/02/2023    EGFRIFNONA 144 11/11/2021    BCR 25.7 (H) 03/02/2023    ANIONGAP 10.6 03/02/2023     Lab Results   Component Value Date    CHOL 120 03/02/2023    TRIG 126 03/02/2023    HDL 39 (L) 03/02/2023    LDL 58 03/02/2023     Microalbumin        3/2/2023    08:54   Microalbumin   Microalbumin, Urine 11.7         Current Medication List  This medication list has been reviewed with the patient and evaluated for any interactions or necessary modifications/recommendations, and updated to include all prescription medications, OTC medications, and supplements the patient is currently taking.  This list reflects what is contained in the patient's profile, which has also been marked as reviewed to communicate to other providers it is the most up to date version of the patient's current medication therapy.     Current Outpatient Medications:   •  ascorbic acid (VITAMIN C) 1000 MG tablet, Take 500 mg by mouth Daily., Disp: , Rfl:   •  cholecalciferol (VITAMIN D3) 25 MCG (1000 UT) tablet, Take 1 tablet by mouth Daily., Disp: , Rfl:   •  Continuous Blood Gluc Sensor (FreeStyle Jeramy 3 Sensor) misc, Change Sensor Every 14 (Fourteen) Days, as directed., Disp: 6 each, Rfl: 3  •   dapagliflozin Propanediol (Farxiga) 10 MG tablet, Take 1 tablet by mouth once daily., Disp: 90 tablet, Rfl: 3  •  glimepiride (Amaryl) 4 MG tablet, Take 2 tablets by mouth Every Morning Before Breakfast. **Replaces Glipizide**, Disp: 180 tablet, Rfl: 3  •  lisinopril (PRINIVIL,ZESTRIL) 40 MG tablet, Take 1 tablet by mouth Daily. **Note strength of tablet changed to 40mg, only take 1 tablet daily**, Disp: 90 tablet, Rfl: 3  •  metFORMIN ER (GLUCOPHAGE-XR) 500 MG 24 hr tablet, Take 2 tablets by mouth 2 (Two) Times a Day., Disp: 360 tablet, Rfl: 3  •  rosuvastatin (CRESTOR) 40 MG tablet, Take 1 tablet by mouth Daily., Disp: 90 tablet, Rfl: 3  •  Semaglutide, 2 MG/DOSE, (Ozempic, 2 MG/DOSE,) 8 MG/3ML solution pen-injector, Inject 2 mg under the skin into the appropriate area as directed 1 (One) Time Per Week., Disp: 9 mL, Rfl: 4    Medicines reviewed by Linwood Morales, PharmD on 5/18/2023 at  3:39 PM    Drug Interactions  No Clinically Significant DDIs Were Identified at Present Time Upon Marking Medications Reviewed    Recommended Medications Assessment  • Aspirin: Not Taking Currently  • Statin: Currently Taking   • ACEi/ARB: Currently Taking     Initial Education Provided for Specialty Medication  The patient has been provided with the following education and any applicable administration techniques (i.e. self-injection) have been demonstrated for the therapies indicated. All questions and concerns have been addressed prior to the patient receiving the medication, and the patient has verbalized comprehension of the education and any materials provided. Additional patient education shall be provided and documented upon request by the patient, provider, or payer.      FARXIGA® (dapagliflozin)  Medication Expectations   Why am I taking this medication? You are taking Farxiga to lower blood sugar because you have type 2 diabetes. Diabetes is not curable but with proper medication and treatment, we can keep your blood  sugar within your personalized target range. This medication also helps reduce the risk of progression of kidney disease, reduce the risk of death from heart attack or stroke, and reduce the risk of heart failure hospitalization.    What should I expect while on this medication? You should expect to see your blood sugar and A1c decrease over time. You may also see a decrease in your blood pressure and it can help some people lose weight.     How does the medication work? Farxiga works by helping to remove some sugar that the body doesn't need through urination.    How long will I be on this medication for? The amount of time you will be on this medication will be determined by your doctor based on blood sugar and A1c control. You will most likely be on this medication or another diabetes medication throughout your lifetime. Do not abruptly stop this medication without talking to your doctor first.    How do I take this medication? Take as directed on your prescription label. This medication is usually taken in the morning and can be given with or without food.    What are some possible side effects? You may notice increased urination, especially when you first start Farxiga. Stuffy or runny nose can also occur. The most common side effects are urinary tract infections and yeast infections and are more commonly seen in females. Talk with your doctor if you notice white or yellow vaginal discharge, vaginal itching or odor of if you notice redness, itching, pain, or swelling of the penis and/or bad-smelling discharge from the penis.    What happens if I miss a dose? If you miss a dose, take it as soon as you remember. If it is close to your next dose, skip it (do not take 2 doses at once)     Medication Safety   What are things I should warn my doctor immediately about? Tell your doctor if you have kidney disease, liver disease, heart failure, pancreas problems, or history of frequent genital yeast or urinary tract  infections. Tell your doctor if you are on a low-salt diet, if you drink alcohol, or if you are having surgery. Talk to your doctor if you are pregnant, planning to become pregnant, or breastfeeding. Also tell your doctor if you notice any signs/symptoms of an allergic reaction (rash, hives, difficulty breathing, etc.).   What are things that I should be cautious of? Be cautious of any side effects from this medication. Talk to your doctor if any new ones develop or aren't getting better.   What are some medications that can interact with this one? Some medications that interact include diuretics (water pills) and other medications that may also lower your blood sugar such as insulins and glipizide/glimepiride/glyburide. Your doctor may reduce the dose of these medications when you start Farxiga to minimize low blood sugars. Always tell your doctor or pharmacist immediately if you start taking any new medications, including over-the-counter medications, vitamins, and herbal supplements.      Medication Storage/Handling   How should I handle this medication? Keep this medication out of reach of pets/children in tightly sealed container   How does this medication need to be stored? Store at room temperature and keep dry (don't keep in bathroom or other room with moisture)   How should I dispose of this medication? There should not be a need to dispose of this medication unless your provider decides to change the dose or therapy. If that is the case, take to your local police station for proper disposal. Some pharmacies also have take-back bins for medication drop-off.      Resources/Support   How can I remind myself to take this medication? You can download reminder apps to help you manage your refills. You may also set an alarm on your phone to remind you. The pharmacy carries pill boxes that you can place next to an area you pass everyday (such as where you place your car keys or where you charge your phone)   Is  financial support available?  [employer] can provide co-pay cards if you have commercial insurance or patient assistance if you have Medicare or no insurance.    Which vaccines are recommended for me? Talk to your doctor about these vaccines: Flu, Coronavirus (COVID-19), Pneumococcal (pneumonia), Tdap, Hepatitis B, Zoster (shingles)          OZEMPIC® (semaglutide)  Medication Expectations   Why am I taking this medication? You are taking Ozempic, along with diet and exercise, to lower blood sugar because you have type 2 diabetes. Diabetes is not curable but with proper medication and treatment, we can keep your blood sugar within your personalized target range. This medication may also help you lose some weight, and it helps reduce the risk of death from heart attack, and stroke in adults with type 2 diabetes and known heart disease.   What should I expect while on this medication? You should expect to see your blood sugar and A1c decrease over time and you may also lose some weight.   How does the medication work? Ozempic is a non-insulin injection that works with your body's own ability to lower blood sugar and A1c and helps your body release its own insulin in response to your blood sugar rising.  This medication also slows down food from leaving your stomach, making you feel gonzalez for longer.   How long will I be on this medication for? The amount of time you will be on this medication will be determined by your doctor based on blood sugar and A1c control. You will most likely be on this medication or another diabetes medication throughout your lifetime. Do not abruptly stop this medication without talking to your doctor first.    How do I take this medication? Take as directed on your prescription label. Ozempic is injected under the skin (subcutaneously) of your stomach, thigh or upper arm.  Use this medication once weekly, on the same day each week, and it can be given with or without food.  Use a different  injection site each week in the same body region.     For each new prefilled pen, prime the needle before the first injection by turning the dose selector to the flow check symbol and injecting into the air (priming is not required for subsequent injections).   • Once needle is inserted, continue to press the button until the dial has returned to 0 and for an additional 6 seconds before removing.    What are some possible side effects? You may notice you don't feel as hungry, especially when you first start using Ozempic.  The most common side effects are nausea, diarrhea, vomiting, stomach pain, and constipation.      Stop using Ozempic and call your doctor immediately if you have severe pain in your stomach area that will not go away as this could be a sign of pancreatitis (inflammation of your pancreas).  Tell your doctor if you get a lump or swelling in your neck, hoarseness, difficulty swallowing, or feel short of breath (these may be symptoms of thyroid cancer).  Talk with your doctor if you have changes in vision while taking Ozempic.   What happens if I miss a dose? If you miss a dose, take it as soon as you remember as long as it is within 5 days after your missed dose.  If more than 5 days have passed, skip the missed dose and resume Ozempic on the regularly scheduled day.     Medication Safety   What are things I should warn my doctor immediately about? Do not use Ozempic if you or a family member have ever had medullary thyroid cancer (MTC) or Multiple Endocrine Neoplasia syndrome type 2 (MEN 2).    • Tell your doctor if you get a lump or swelling in your neck, hoarseness, difficulty swallowing, or feel short of breath (these may be symptoms of thyroid cancer).    Tell your doctor if you have or have had problems with your kidneys or pancreas.   • Stop using Ozempic and get medical help right away if you have severe pain in your stomach area that will not go away as this could be a sign of pancreatitis  (inflammation of your pancreas)  Tell your doctor if you have problem digesting food or slowed emptying of your stomach (gastroparesis).    Let your doctor know if you have changes in vision while taking Ozempic or have been diagnosed with diabetic retinopathy.    Talk to your doctor if you are pregnant, planning to become pregnant, or breastfeeding.     Get medical help right away if you notice any signs/symptoms of an allergic reaction (rash, hives, difficulty breathing, etc.).   What are things that I should be cautious of? Be cautious of any side effects from this medication. Talk to your doctor if any new ones develop or aren't getting better.   What are some medications that can interact with this one? Taking Ozempic with other medications that also lower your blood sugar such as insulin and glipizide/glimepiride/glyburide may increase the risk of low blood sugar.  • Your doctor may reduce the dose of these medications when you start Ozempic to minimize low blood sugars    It should not be taken with other medicines called GLP-1 receptor agonists, because these work the same way as Ozempic.      Because Ozempic slows stomach emptying, it can affect the way some medicines work.    Always tell your doctor or pharmacist immediately if you start taking any new medications, including over-the-counter medications, vitamins, and herbal supplements.     Medication Storage/Handling   How should I handle this medication? Keep this medication out of reach of pets/children and keep the pen capped when not in use.   How does this medication need to be stored? Store in the refrigerator prior to first use, but do not freeze.  After first use, you may continue to store in the refrigerator or at room temperature for 56 days.  Protect from excessive heat and sunlight.   How should I dispose of this medication? Used Ozempic pens should be thrown away after 56 days.  Place your used Ozempic pen and needle in an approved sharps  container after use.  If you do not have a sharps container, you may use a household container made of heavy-duty plastic with a tight-fitting lid that is leak resistant (e.g., heavy-duty plastic laundry detergent bottle).    If your doctor decides to stop this medication, take to your local police station for proper disposal. Some pharmacies also have take-back bins for medication drop-off.      Resources/Support   How can I remind myself to take this medication? You can download reminder apps to help you manage your refills. You may also set an alarm on your phone to remind you.    Is financial support available?  [employer] can provide co-pay cards if you have commercial insurance or patient assistance if you have Medicare or no insurance.    Which vaccines are recommended for me? Talk to your doctor about these vaccines:  • Flu   • Coronavirus (COVID-19)   • Pneumococcal (pneumonia)   • Tdap   • Hepatitis B   • Zoster (shingles)        Adherence and Self-Administration  Adherence related to the patient's specialty therapy was discussed with the patient. The Adherence segment of this outreach has been reviewed and updated.     Is there a concern with patient's ability to self administer the medication correctly and without issue?: No  Were any potential barriers to adherence identified during the initial assessment or patient education?: No  Are there any concerns regarding the patient's understanding of the importance of medication adherence?: No  • Methods for Supporting Patient Adherence and/or Self-Administration: Continue current measures.  If pt has access to meds he has historically been strongly adherent.    Goals of Therapy  Goals related to the patient's specialty therapy were discussed with the patient. The Patient Goals segment of this outreach has been reviewed and updated.   Goals     •  Specialty Pharmacy General Goal (pt-stated)       Patient Stated: Pt hopes to have consistent access to  medications moving forward so that he can take every day as prescribed and reduce A1c.     3/2/2023:  Access and adherence are much better for target medications but not for CGM.  Changed to Jeramy 3 today which decreased copay by ~50%.  Pt receiving 3 month supply.  A1c improving with target med adherence and expect to improve more with real-time CGM feedback.  CR    5/18/2023:  Pt now has new job and insurance plan.  Pt is able to access most medications easier and less expensively than previously.  CR      •  Specialty Pharmacy General Goal       Clinical Goal: Reduce A1c to 6-7%    3/2/2023:  A1c decreased from 7.7% to 7.5% since last visit.  Adherence has been much better but CGM access has been a barrier.  Hope to see this number continue to fall with continuous usage of CGM.  CR    5/18/2023:  Phone initial assessment for newly converted medications within same therapeutic classes as previous target meds.  Pt has maintained adherence and is believed to be improving.  Will fanny on track at this time.  CR          Reassessment Plan & Follow-Up  1. Medication Therapy Changes: Changing Trulicity to Ozempic and Steglatro to Farxiga per new insurance formulary.  2. Related Plans, Therapy Recommendations, or Therapy Problems to Be Addressed: Sending initial fills of Farxiga and Ozempic on 5/19 for Sa 5/20 delivery.  3. Pharmacist to perform regular assessments no more than (6) months from the previous assessment.  4. Care Coordinator to set up future refill outreaches, coordinate prescription delivery, and escalate clinical questions to pharmacist.  5. Welcome information and patient satisfaction survey to be sent by specialty pharmacy team with patient's initial fill.    Attestation  Therapeutic appropriateness: Appropriate   If the prescribed therapy is at any point deemed not appropriate based on the current or future assessments, a consultation will be initiated with the patient's specialty care provider to  determine the best course of action. The revised plan of therapy will be documented along with any required assessments and/or additional patient education provided.       Linwood Morales, PharmD, MPH  Clinical Specialty Pharmacist, Endocrinology  5/18/2023  15:48 EDT

## 2023-06-13 ENCOUNTER — SPECIALTY PHARMACY (OUTPATIENT)
Dept: ENDOCRINOLOGY | Facility: CLINIC | Age: 43
End: 2023-06-13
Payer: COMMERCIAL

## 2023-06-13 NOTE — PROGRESS NOTES
" Specialty Pharmacy Patient Management Program  Endocrinology Refill Outreach      George \"Maggie" is a 43 y.o. male contacted today regarding refills of his medication(s).    Specialty medication(s) and dose(s) confirmed: Farxiga, Ozempic  Other medications being refilled: Rosuvastatin     Refill Questions      Flowsheet Row Most Recent Value   Changes to allergies? No   Changes to medications? No   New conditions since last clinic visit No   Unplanned office visit, urgent care, ED, or hospital admission in the last 4 weeks  No   How does patient/caregiver feel medication is working? Very good   Financial problems or insurance changes  No   Since the previous refill, were any specialty medication doses or scheduled injections missed or delayed?  No   Does this patient require a clinical escalation to a pharmacist? No            Delivery Questions      Flowsheet Row Most Recent Value   Delivery method [employer]   Delivery address correct? Yes   Delivery phone number [phone]   Preferred delivery time? Anytime   Number of medications in delivery 3   Medication being filled and delivered Farxiga, Ozempic, Rosuvastatin   Is there any medication that is due not being filled? No   Supplies needed? No supplies needed   Cooler needed? Yes   Do any medications need mixed or dated? No   Copay form of payment Credit card on file   Additional comments Copay $45.37   Questions or concerns for the pharmacist? No   Are any medications first time fills? No   Shipment status Cooler packed, Delivery complete            Medication Adherence    Adherence tools used: patient uses a pill box to manage medications  Support network for adherence: healthcare provider            Follow-up: 28 Days Ozempic, 30 Days Farxiga, 90 Days Rosuvastatin      Paloma Johnston, Care Coordinator   Endocrinology  6/13/2023  10:50 EDT        "

## 2023-07-24 ENCOUNTER — OFFICE VISIT (OUTPATIENT)
Dept: ENDOCRINOLOGY | Facility: CLINIC | Age: 43
End: 2023-07-24
Payer: COMMERCIAL

## 2023-07-24 ENCOUNTER — SPECIALTY PHARMACY (OUTPATIENT)
Dept: ENDOCRINOLOGY | Facility: CLINIC | Age: 43
End: 2023-07-24
Payer: COMMERCIAL

## 2023-07-24 VITALS
HEIGHT: 74 IN | WEIGHT: 220 LBS | DIASTOLIC BLOOD PRESSURE: 80 MMHG | SYSTOLIC BLOOD PRESSURE: 122 MMHG | BODY MASS INDEX: 28.23 KG/M2

## 2023-07-24 DIAGNOSIS — E78.2 MIXED HYPERLIPIDEMIA: ICD-10-CM

## 2023-07-24 DIAGNOSIS — E11.65 TYPE 2 DIABETES MELLITUS WITH HYPERGLYCEMIA, WITHOUT LONG-TERM CURRENT USE OF INSULIN: Primary | ICD-10-CM

## 2023-07-24 DIAGNOSIS — E11.29 TYPE 2 DIABETES MELLITUS WITH MICROALBUMINURIA, WITHOUT LONG-TERM CURRENT USE OF INSULIN: ICD-10-CM

## 2023-07-24 DIAGNOSIS — R80.9 TYPE 2 DIABETES MELLITUS WITH MICROALBUMINURIA, WITHOUT LONG-TERM CURRENT USE OF INSULIN: ICD-10-CM

## 2023-07-24 LAB
EXPIRATION DATE: ABNORMAL
EXPIRATION DATE: NORMAL
GLUCOSE BLDC GLUCOMTR-MCNC: 160 MG/DL (ref 70–130)
HBA1C MFR BLD: 6.6 %
Lab: ABNORMAL
Lab: NORMAL

## 2023-07-24 PROCEDURE — 95251 CONT GLUC MNTR ANALYSIS I&R: CPT | Performed by: INTERNAL MEDICINE

## 2023-07-24 PROCEDURE — 99214 OFFICE O/P EST MOD 30 MIN: CPT | Performed by: INTERNAL MEDICINE

## 2023-07-24 PROCEDURE — 83036 HEMOGLOBIN GLYCOSYLATED A1C: CPT | Performed by: INTERNAL MEDICINE

## 2023-07-24 NOTE — PROGRESS NOTES
"     Office Note      Date: 2023  Patient Name: George Tolentino  MRN: [id number]  : 1980    Chief Complaint   Patient presents with    Diabetes       History of Present Illness:   George Tolentino is a 43 y.o. male who presents for Diabetes type 2. Diagnosed in: . Treated in past with oral agents, insulin and trulicity. Current treatments: farxiga, glimepiride, metformin and ozempic.  Number of insulin shots per day: none. Checks blood sugar 2 times a week. Has low blood sugar: rare. Aspirin use: No - no indication. Statin use: Yes. ACE-I/ARB use: Yes. Changes in health since last visit: none. Last eye exam: 2022.     Subjective      Diabetic Complications:  Eyes: No  Kidneys: Yes - microalbuminuria  Feet: No  Heart: No    Diet and Exercise:  Meals per day: 3  Minutes of exercise per week: 0 mins.    Review of Systems:   Review of Systems   Constitutional: Negative.    Cardiovascular: Negative.    Gastrointestinal: Negative.    Endocrine: Negative.      The following portions of the patient's history were reviewed and updated as appropriate: allergies, current medications, past family history, past medical history, past social history, past surgical history, and problem list.    Objective       Visit Vitals  /80   Ht 188 cm (74.02\")   Wt 99.8 kg (220 lb)   BMI 28.23 kg/m²       Physical Exam:  Physical Exam  Constitutional:       Appearance: Normal appearance.   Cardiovascular:      Pulses:           Dorsalis pedis pulses are 2+ on the right side and 2+ on the left side.        Posterior tibial pulses are 2+ on the right side and 2+ on the left side.   Musculoskeletal:      Right foot: Deformity present.      Left foot: Deformity present.   Feet:      Right foot:      Protective Sensation: 5 sites tested.  5 sites sensed.      Skin integrity: Skin integrity normal.      Toenail Condition: Right toenails are normal.      Left foot:      Protective Sensation: 5 sites tested.  5 sites sensed.      " Skin integrity: Skin integrity normal.      Toenail Condition: Left toenails are normal.      Comments: Flat feet  Neurological:      Mental Status: He is alert.       Labs:    HbA1c  Lab Results   Component Value Date    HGBA1C 6.6 07/24/2023       CMP  Lab Results   Component Value Date    GLUCOSE 160 (H) 03/02/2023    BUN 19 03/02/2023    CREATININE 0.74 (L) 03/02/2023    EGFRIFNONA 144 11/11/2021    BCR 25.7 (H) 03/02/2023    K 4.4 03/02/2023    CO2 25.4 03/02/2023    CALCIUM 10.2 03/02/2023    AST 31 03/02/2023    ALT 51 (H) 03/02/2023        Lipid Panel  Lab Results   Component Value Date    HDL 39 (L) 03/02/2023    LDL 58 03/02/2023    TRIG 126 03/02/2023        TSH  Lab Results   Component Value Date    TSH 1.070 03/02/2023        Hemoglobin A1C  Lab Results   Component Value Date    HGBA1C 6.6 07/24/2023        Microalbumin/Creatinine  Lab Results   Component Value Date    MALBCRERATIO 191.2 03/02/2023    MICROALBUR 11.7 03/02/2023           Assessment / Plan      Assessment & Plan:  Diagnoses and all orders for this visit:    1. Type 2 diabetes mellitus with hyperglycemia, without long-term current use of insulin (Primary)  Assessment & Plan:  Diabetes is improving with treatment.  A1c looks great.  Had some nausea with 2mg ozempic.  Doing well with 1mg dose now.  Continue current treatment regimen.  Diabetes will be reassessed in 3 months.    FreeStyle Jeramy 3 was downloaded today.  Data was reviewed from 7/11/23 to 7/24/23.  This showed good overall glucose control with some mild postprandial spikes but no patterns for medication adjustments.      Orders:  -     POC Glycosylated Hemoglobin (Hb A1C)  -     POC Glucose, Blood    2. Type 2 diabetes mellitus with microalbuminuria, without long-term current use of insulin  Assessment & Plan:  Continue ACE-I and SGLT-2 inhibitor.  Plan to recheck microalbumin next visit.      3. Mixed hyperlipidemia  Assessment & Plan:  Continue statin.  Lipids were okay last  visit.        Current Outpatient Medications   Medication Instructions    ascorbic acid (VITAMIN C) 500 mg, Oral, Daily    cholecalciferol (VITAMIN D3) 1,000 Units, Oral, Daily    Continuous Blood Gluc Sensor (FreeStyle Jeramy 3 Sensor) misc Change Sensor Every 14 (Fourteen) Days, as directed.    dapagliflozin Propanediol (Farxiga) 10 MG tablet Take 1 tablet by mouth once daily.    glimepiride (AMARYL) 8 mg, Oral, Every Morning Before Breakfast, **Replaces Glipizide**    lisinopril (PRINIVIL,ZESTRIL) 40 mg, Oral, Daily, **Note strength of tablet changed to 40mg, only take 1 tablet daily**    metFORMIN ER (GLUCOPHAGE-XR) 1,000 mg, Oral, 2 Times Daily    Ozempic (1 MG/DOSE) 1 mg, Subcutaneous, Weekly    rosuvastatin (CRESTOR) 40 mg, Oral, Daily      Return in about 3 months (around 10/24/2023) for Recheck with A1c, microalbumin.    Franco Schneider MD   07/24/2023

## 2023-07-24 NOTE — ASSESSMENT & PLAN NOTE
Diabetes is improving with treatment.  A1c looks great.  Had some nausea with 2mg ozempic.  Doing well with 1mg dose now.  Continue current treatment regimen.  Diabetes will be reassessed in 3 months.    FreeStyle Jeramy 3 was downloaded today.  Data was reviewed from 7/11/23 to 7/24/23.  This showed good overall glucose control with some mild postprandial spikes but no patterns for medication adjustments.

## 2023-07-24 NOTE — PROGRESS NOTES
Specialty Pharmacy Patient Management Program  Endocrinology Reassessment     George Tolentino is a 43 y.o. male seen by an Endocrinology provider for Type 2 Diabetes and enrolled in the Endocrinology Patient Management program offered by [facility].  A follow-up outreach was conducted, including assessment of continued therapy appropriateness, medication adherence, and side effect incidence and management for Farxiga and Ozempic.    Changes to Insurance Coverage or Financial Support  No changes at this time ([facility])    Relevant Past Medical History and Comorbidities  Relevant medical history and concomitant health conditions were discussed with the patient. The patient's chart has been reviewed for relevant past medical history and comorbid health conditions and updated as necessary.   Past Medical History:   Diagnosis Date    Anxiety     Depression     Diabetes mellitus     DM type 2 causing renal disease     GERD (gastroesophageal reflux disease)     Hypertension     Mixed hyperlipidemia     Mood disorder     Obesity     body mass index 30+-obesity    Type 2 diabetes mellitus, uncontrolled      Social History     Socioeconomic History    Marital status:    Tobacco Use    Smoking status: Former     Types: Cigars     Start date: 2010    Smokeless tobacco: Never    Tobacco comments:     1 per week   Vaping Use    Vaping Use: Former    Substances: THC    Devices: Pre-filled or refillable cartridge   Substance and Sexual Activity    Alcohol use: Yes     Comment: 10 drinks/week    Drug use: Yes     Types: Marijuana    Sexual activity: Defer     Problem list reviewed by Linwood Morales, PharmD on 7/24/2023 at  8:27 AM    Hospitalizations and Urgent Care Since Last Assessment  ED Visits, Admissions, or Hospitalizations: None  Urgent Office Visits: None    Allergies  Known allergies and reactions were discussed with the patient. The patient's chart has been reviewed for allergy information and  updated as necessary.   No Known Allergies  Allergies reviewed by Linwood Morales, PharmD on 7/24/2023 at  8:28 AM    Relevant Laboratory Values  A1C Last 3 Results          9/20/2022    14:55 3/2/2023    08:06 7/24/2023    07:50   HGBA1C Last 3 Results   Hemoglobin A1C 7.7  7.5  6.6      Lab Results   Component Value Date    HGBA1C 6.6 07/24/2023     Lab Results   Component Value Date    GLUCOSE 160 (H) 03/02/2023    CALCIUM 10.2 03/02/2023     (L) 03/02/2023    K 4.4 03/02/2023    CO2 25.4 03/02/2023    CL 99 03/02/2023    BUN 19 03/02/2023    CREATININE 0.74 (L) 03/02/2023    EGFRIFNONA 144 11/11/2021    BCR 25.7 (H) 03/02/2023    ANIONGAP 10.6 03/02/2023     Lab Results   Component Value Date    CHOL 120 03/02/2023    TRIG 126 03/02/2023    HDL 39 (L) 03/02/2023    LDL 58 03/02/2023     Microalbumin          3/2/2023    08:54   Microalbumin   Microalbumin, Urine 11.7      Current Medication List  This medication list has been reviewed with the patient and evaluated for any interactions or necessary modifications/recommendations, and updated to include all prescription medications, OTC medications, and supplements the patient is currently taking.  This list reflects what is contained in the patient's profile, which has also been marked as reviewed to communicate to other providers it is the most up to date version of the patient's current medication therapy.     Current Outpatient Medications:     ascorbic acid (VITAMIN C) 1000 MG tablet, Take 500 mg by mouth Daily., Disp: , Rfl:     cholecalciferol (VITAMIN D3) 25 MCG (1000 UT) tablet, Take 1 tablet by mouth Daily., Disp: , Rfl:     Continuous Blood Gluc Sensor (FreeStyle Jeramy 3 Sensor) [facility], Change Sensor Every 14 (Fourteen) Days, as directed., Disp: 6 each, Rfl: 3    dapagliflozin Propanediol (Farxiga) 10 MG tablet, Take 1 tablet by mouth once daily., Disp: 90 tablet, Rfl: 3    glimepiride (Amaryl) 4 MG tablet, Take 2 tablets by mouth Every Morning  Before Breakfast. **Replaces Glipizide**, Disp: 180 tablet, Rfl: 3    lisinopril (PRINIVIL,ZESTRIL) 40 MG tablet, Take 1 tablet by mouth Daily. **Note strength of tablet changed to 40mg, only take 1 tablet daily**, Disp: 90 tablet, Rfl: 3    metFORMIN ER (GLUCOPHAGE-XR) 500 MG 24 hr tablet, Take 2 tablets by mouth 2 (Two) Times a Day., Disp: 360 tablet, Rfl: 3    rosuvastatin (CRESTOR) 40 MG tablet, Take 1 tablet by mouth Daily., Disp: 90 tablet, Rfl: 3    Semaglutide, 1 MG/DOSE, (Ozempic, 1 MG/DOSE,) 4 MG/3ML solution pen-injector, Inject 1 mg under the skin into the appropriate area as directed 1 (One) Time Per Week., Disp: 9 mL, Rfl: 3    Medicines reviewed by Linwood Morales, PharmD on 7/24/2023 at  8:27 AM    Drug Interactions  No Clinically Significant DDIs Were Identified at Present Time Upon Marking Medications Reviewed    Recommended Medications Assessment  Aspirin: Not Taking Currently  Statin: Currently Taking   ACEi/ARB: Currently Taking     Adverse Drug Reactions  Medication tolerability: Tolerating with no to minimal ADRs  Medication plan: Continue therapy with normal follow-up  Plan for ADR Management: N/A at this time.  Pt was experiencing some nausea with Ozempic 2mg weekly, but this has completely resolved after decreasing to 1mg weekly.  No ADEs with Farxiga.    Adherence, Self-Administration, and Current Therapy Problems  Adherence related to the patient's specialty therapy was discussed with the patient. The Adherence segment of this outreach has been reviewed and updated.     Adherence Questions  Medication(s) assessed: Farxiga, Ozempic  On average, how many doses/injections does the patient miss per month?: 0  What are the identified reasons for non-adherence or missed doses? : no problems identfied  What is the estimated medication adherence level?: %  Based on the patient/caregiver response and refill history, does this patient require an MTP to track adherence improvements?:  no    Additional Barriers to Patient Self-Administration: No known barriers at this time.  All financial/coverage barriers have been resolved as of this time.  Methods for Supporting Patient Self-Administration: Continue current measures.     Medication Therapy Recommendations  No medication therapy recommendations to display    Goals of Therapy  Goals related to the patient's specialty therapy were discussed with the patient. The Patient Goals segment of this outreach has been reviewed and updated.   Goals        Specialty Pharmacy General Goal      Maintain A1c <7%    7/24/2023:  A1c has decreased to 6.6% from 7.5% during previous visit.  Goal completed.  Will now focus on maintaining A1c <7%.  CR              Quality of Life Assessment   Quality of Life related to the patient's enrollment in the patient management program and services provided was discussed with the patient. The QOL segment of this outreach has been reviewed and updated.  Quality of Life Improvement Scale: Significantly better    Reassessment Plan & Follow-Up  1. Medication Therapy Changes: No concrete changes at this time but may consider decreasing glimepiride to 1-1.5mg daily if hypoglycemia persists.  Also may consider adding Kerendia after next visit if uACr at that time is abnormal.     2. Related Plans, Therapy Recommendations, or Issues to Be Addressed: See above for potential future changes.  3. Pharmacist to perform regular assessments no more than (6) months from the previous assessment.  4. Care Coordinator to set up future refill outreaches, coordinate prescription delivery, and escalate clinical questions to pharmacist.    Attestation  Therapeutic appropriateness: Appropriate   If the prescribed therapy is at any point deemed not appropriate based on the current or future assessments, a consultation will be initiated with the patient's specialty care provider to determine the best course of action. The revised plan of therapy will  be documented along with any required assessments and/or additional patient education provided.       Linwood Morales, PharmD, MPH  Clinical Specialty Pharmacist, Endocrinology  7/24/2023  08:47 EDT

## 2023-08-08 ENCOUNTER — SPECIALTY PHARMACY (OUTPATIENT)
Dept: ENDOCRINOLOGY | Facility: CLINIC | Age: 43
End: 2023-08-08
Payer: COMMERCIAL

## 2023-08-08 NOTE — PROGRESS NOTES
" Specialty Pharmacy Patient Management Program  Endocrinology Refill Outreach      George \"Maggie" is a 43 y.o. male contacted today regarding refills of his medication(s).    Specialty medication(s) and dose(s) confirmed: Farxiga, Ozempic   Other medications being refilled: Jeramy Sensors     Refill Questions      Flowsheet Row Most Recent Value   Changes to allergies? No   Changes to medications? No   New conditions since last clinic visit No   Unplanned office visit, urgent care, ED, or hospital admission in the last 4 weeks  No   How does patient/caregiver feel medication is working? Very good   Financial problems or insurance changes  No   Since the previous refill, were any specialty medication doses or scheduled injections missed or delayed?  No   Does this patient require a clinical escalation to a pharmacist? No            Delivery Questions      Flowsheet Row Most Recent Value   Delivery method [employer]   Delivery address correct? Yes   Delivery phone number [phone]   Preferred delivery time? Anytime   Number of medications in delivery 3   Medication being filled and delivered Jeramy Sensors, Ozempic, Farxiga   Is there any medication that is due not being filled? No   Supplies needed? No supplies needed   Cooler needed? Yes   Do any medications need mixed or dated? No   Copay form of payment Credit card on file   Additional comments Copay $99.97   Questions or concerns for the pharmacist? No   Are any medications first time fills? No   Shipment status Cooler packed, Delivery complete            Medication Adherence    Adherence tools used: patient uses a pill box to manage medications  Support network for adherence: healthcare provider            Follow-up: 28 Days Sensors, Ozempic, 30 Days Edilberto Johnston, Care Coordinator   Endocrinology  8/8/2023  11:27 EDT          "

## 2023-09-08 ENCOUNTER — SPECIALTY PHARMACY (OUTPATIENT)
Dept: ENDOCRINOLOGY | Facility: CLINIC | Age: 43
End: 2023-09-08
Payer: COMMERCIAL

## 2023-09-08 NOTE — PROGRESS NOTES
" Specialty Pharmacy Patient Management Program  Endocrinology Refill Outreach      George \"Maggie" is a 43 y.o. male contacted today regarding refills of his medication(s).    Specialty medication(s) and dose(s) confirmed: Farxiga, Ozempic  Other medication(s) being refilled: Jeramy 3, Rosuvastatin    Refill Questions      Flowsheet Row Most Recent Value   Changes to allergies? No   Changes to medications? No   New conditions since last clinic visit No   Unplanned office visit, urgent care, ED, or hospital admission in the last 4 weeks  No   How does patient/caregiver feel medication is working? Very good   Financial problems or insurance changes  No   Since the previous refill, were any specialty medication doses or scheduled injections missed or delayed?  No   Does this patient require a clinical escalation to a pharmacist? No          Delivery Questions      Flowsheet Row Most Recent Value   Delivery method [employer]   Delivery address correct? Yes   Delivery phone number [phone]   Preferred delivery time? Anytime   Number of medications in delivery 4   Medication being filled and delivered Farxiga, Ozempic, Jeramy 3, Rosuvastatin   Is there any medication that is due not being filled? No   Supplies needed? No supplies needed   Cooler needed? Yes   Do any medications need mixed or dated? No   Copay form of payment Credit card on file   Additional comments Copay $120.36   Questions or concerns for the pharmacist? No   Are any medications first time fills? No   Shipment status Cooler packed, Delivery complete            Medication Adherence    Adherence tools used: patient uses a pill box to manage medications  Support network for adherence: healthcare provider         Follow-Up: 28d      Linwood Morales, PharmD, MPH  Clinical Specialty Pharmacist, Endocrinology  9/8/2023  14:14 EDT    "

## 2023-09-29 ENCOUNTER — SPECIALTY PHARMACY (OUTPATIENT)
Dept: ENDOCRINOLOGY | Facility: CLINIC | Age: 43
End: 2023-09-29
Payer: COMMERCIAL

## 2023-09-29 NOTE — PROGRESS NOTES
" Specialty Pharmacy Patient Management Program  Endocrinology Refill Outreach      George \"Maggie" is a 43 y.o. male contacted today regarding refills of his medication(s).    Specialty medication(s) and dose(s) confirmed: Ozempic, Farxiga  Other medications being refilled: Jeramy Sensors, Glimepiride, Lisinopril, Metformin     Refill Questions      Flowsheet Row Most Recent Value   Changes to allergies? No   Changes to medications? No   New conditions since last clinic visit No   Unplanned office visit, urgent care, ED, or hospital admission in the last 4 weeks  No   How does patient/caregiver feel medication is working? Very good   Financial problems or insurance changes  No   Since the previous refill, were any specialty medication doses or scheduled injections missed or delayed?  No   Does this patient require a clinical escalation to a pharmacist? No            Delivery Questions      Flowsheet Row Most Recent Value   Delivery method [employer]   Delivery address correct? Yes   Delivery phone number [phone]   Preferred delivery time? Anytime   Number of medications in delivery 6   Medication being filled and delivered Ozempic, Farxiga, Glimepiride, Jeramy Sensors, Lisinopril, Metformin   Is there any medication that is due not being filled? No   Supplies needed? No supplies needed   Cooler needed? Yes   Do any medications need mixed or dated? No   Copay form of payment Credit card on file   Additional comments Copay TBD   Questions or concerns for the pharmacist? No   Are any medications first time fills? No   Shipment status Cooler packed, Delivery complete            Medication Adherence    Adherence tools used: patient uses a pill box to manage medications  Support network for adherence: healthcare provider            Follow-up: 28 Days, Jeramy Sensors, Ozempic, 30 Days, Farxiga, 90 Days, Glimepiride, Metformin, Lisinopril      Paloma Johnston, Care Coordinator   Endocrinology  9/29/2023  14:42 EDT        "
No

## 2023-10-24 ENCOUNTER — OFFICE VISIT (OUTPATIENT)
Dept: ENDOCRINOLOGY | Facility: CLINIC | Age: 43
End: 2023-10-24
Payer: COMMERCIAL

## 2023-10-24 ENCOUNTER — SPECIALTY PHARMACY (OUTPATIENT)
Dept: ENDOCRINOLOGY | Facility: CLINIC | Age: 43
End: 2023-10-24
Payer: COMMERCIAL

## 2023-10-24 VITALS
DIASTOLIC BLOOD PRESSURE: 70 MMHG | SYSTOLIC BLOOD PRESSURE: 126 MMHG | HEART RATE: 83 BPM | BODY MASS INDEX: 28.62 KG/M2 | HEIGHT: 74 IN | OXYGEN SATURATION: 98 % | WEIGHT: 223 LBS

## 2023-10-24 DIAGNOSIS — E78.2 MIXED HYPERLIPIDEMIA: ICD-10-CM

## 2023-10-24 DIAGNOSIS — E11.65 TYPE 2 DIABETES MELLITUS WITH HYPERGLYCEMIA, WITHOUT LONG-TERM CURRENT USE OF INSULIN: Primary | ICD-10-CM

## 2023-10-24 DIAGNOSIS — R80.9 TYPE 2 DIABETES MELLITUS WITH MICROALBUMINURIA, WITHOUT LONG-TERM CURRENT USE OF INSULIN: ICD-10-CM

## 2023-10-24 DIAGNOSIS — N46.9 INFERTILITY MALE: ICD-10-CM

## 2023-10-24 DIAGNOSIS — E11.29 TYPE 2 DIABETES MELLITUS WITH MICROALBUMINURIA, WITHOUT LONG-TERM CURRENT USE OF INSULIN: ICD-10-CM

## 2023-10-24 LAB
ALBUMIN UR-MCNC: 3.4 MG/DL
CREAT UR-MCNC: 60.7 MG/DL
EXPIRATION DATE: ABNORMAL
EXPIRATION DATE: NORMAL
GLUCOSE BLDC GLUCOMTR-MCNC: 117 MG/DL (ref 70–130)
HBA1C MFR BLD: 6.6 % (ref 4.5–5.7)
Lab: ABNORMAL
Lab: NORMAL
MICROALBUMIN/CREAT UR: 56 MG/G (ref 0–29)

## 2023-10-24 PROCEDURE — 82570 ASSAY OF URINE CREATININE: CPT | Performed by: INTERNAL MEDICINE

## 2023-10-24 PROCEDURE — 82043 UR ALBUMIN QUANTITATIVE: CPT | Performed by: INTERNAL MEDICINE

## 2023-10-24 PROCEDURE — 95251 CONT GLUC MNTR ANALYSIS I&R: CPT | Performed by: INTERNAL MEDICINE

## 2023-10-24 PROCEDURE — 99214 OFFICE O/P EST MOD 30 MIN: CPT | Performed by: INTERNAL MEDICINE

## 2023-10-24 PROCEDURE — 83036 HEMOGLOBIN GLYCOSYLATED A1C: CPT | Performed by: INTERNAL MEDICINE

## 2023-10-24 NOTE — ASSESSMENT & PLAN NOTE
Diabetes is unchanged.   Continue current treatment regimen.  Diabetes will be reassessed in 3 months.    FreeStyle Jeramy 3 CGM was downloaded today.  Data was reviewed from 10/11/23 to 10/24/23.  This showed fairly good glucose control with occ postprandial spikes.  No patterns for medication adjustment at this time.

## 2023-10-24 NOTE — ASSESSMENT & PLAN NOTE
Continue ACE-I and SGLT-2 inhibitor.  Check microalbumin today.  If still elevated, consider adding Kerendia.

## 2023-10-24 NOTE — PROGRESS NOTES
Specialty Pharmacy Patient Management Program  Endocrinology Reassessment     George Tolentino is a 43 y.o. male seen by an Endocrinology provider for Type 2 Diabetes and enrolled in the Endocrinology Patient Management program offered by [facility].  A follow-up outreach was conducted, including assessment of continued therapy appropriateness, medication adherence, and side effect incidence and management for Farxiga and Ozempic.    Changes to Insurance Coverage or Financial Support  None    Relevant Past Medical History and Comorbidities  Relevant medical history and concomitant health conditions were discussed with the patient. The patient's chart has been reviewed for relevant past medical history and comorbid health conditions and updated as necessary.   Past Medical History:   Diagnosis Date    Anxiety     Depression     Diabetes mellitus     DM type 2 causing renal disease     GERD (gastroesophageal reflux disease)     Hypertension     Mixed hyperlipidemia     Mood disorder     Obesity     body mass index 30+-obesity    Type 2 diabetes mellitus, uncontrolled      Social History     Socioeconomic History    Marital status:    Tobacco Use    Smoking status: Former     Types: Cigars     Start date: 2010    Smokeless tobacco: Never    Tobacco comments:     1 per week   Vaping Use    Vaping Use: Former    Substances: THC    Devices: Pre-filled or refillable cartridge   Substance and Sexual Activity    Alcohol use: Yes     Comment: 10 drinks/week    Drug use: Yes     Types: Marijuana    Sexual activity: Defer     Problem list reviewed by Celine Hussein, PharmD on 10/24/2023 at 11:13 AM    Hospitalizations and Urgent Care Since Last Assessment  ED Visits, Admissions, or Hospitalizations: None Reported  Urgent Office Visits: None Reported    Allergies  Known allergies and reactions were discussed with the patient. The patient's chart has been reviewed for allergy information and updated  as necessary.   No Known Allergies  Allergies reviewed by Celine Hussein, PharmGINO on 10/24/2023 at 11:10 AM  Allergies reviewed by Celine Hussein PharmD on 10/24/2023 at 11:13 AM    Relevant Laboratory Values  Relevant laboratory values were discussed with the patient. The following specialty medication dose adjustment(s) are recommended: None  A1C Last 3 Results          3/2/2023    08:06 7/24/2023    07:50 10/24/2023    08:05   HGBA1C Last 3 Results   Hemoglobin A1C 7.5  6.6  6.6      Lab Results   Component Value Date    HGBA1C 6.6 (A) 10/24/2023     Lab Results   Component Value Date    GLUCOSE 160 (H) 03/02/2023    CALCIUM 10.2 03/02/2023     (L) 03/02/2023    K 4.4 03/02/2023    CO2 25.4 03/02/2023    CL 99 03/02/2023    BUN 19 03/02/2023    CREATININE 0.74 (L) 03/02/2023    EGFRIFNONA 144 11/11/2021    BCR 25.7 (H) 03/02/2023    ANIONGAP 10.6 03/02/2023     Lab Results   Component Value Date    CHOL 120 03/02/2023    TRIG 126 03/02/2023    HDL 39 (L) 03/02/2023    LDL 58 03/02/2023     Microalbumin          3/2/2023    08:54   Microalbumin   Microalbumin, Urine 11.7      Current Medication List  This medication list has been reviewed with the patient and evaluated for any interactions or necessary modifications/recommendations, and updated to include all prescription medications, OTC medications, and supplements the patient is currently taking.  This list reflects what is contained in the patient's profile, which has also been marked as reviewed to communicate to other providers it is the most up to date version of the patient's current medication therapy.     Current Outpatient Medications:     cholecalciferol (VITAMIN D3) 25 MCG (1000 UT) tablet, Take 1 tablet by mouth Daily., Disp: , Rfl:     Continuous Blood Gluc Sensor (FreeStyle Jeramy 3 Sensor) misc, Change Sensor Every 14 (Fourteen) Days, as directed., Disp: 6 each, Rfl: 3    dapagliflozin Propanediol (Farxiga) 10 MG tablet, Take 1 tablet by  mouth once daily., Disp: 90 tablet, Rfl: 3    glimepiride (Amaryl) 4 MG tablet, Take 2 tablets by mouth Every Morning Before Breakfast. **Replaces Glipizide**, Disp: 180 tablet, Rfl: 3    lisinopril (PRINIVIL,ZESTRIL) 40 MG tablet, Take 1 tablet by mouth Daily. **Note strength of tablet changed to 40mg, only take 1 tablet daily**, Disp: 90 tablet, Rfl: 3    metFORMIN ER (GLUCOPHAGE-XR) 500 MG 24 hr tablet, Take 2 tablets by mouth 2 (Two) Times a Day., Disp: 360 tablet, Rfl: 3    rosuvastatin (CRESTOR) 40 MG tablet, Take 1 tablet by mouth Daily., Disp: 90 tablet, Rfl: 3    Semaglutide, 1 MG/DOSE, (Ozempic, 1 MG/DOSE,) 4 MG/3ML solution pen-injector, Inject 1 mg under the skin into the appropriate area as directed 1 (One) Time Per Week., Disp: 9 mL, Rfl: 3    Medicines reviewed by Celine Hussein, PharmD on 10/24/2023 at 11:13 AM    Drug Interactions  No Clinically Significant DDIs Were Identified at Present Time Upon Marking Medications Reviewed      Recommended Medications Assessment  Aspirin: Not Taking Currently  Statin: Currently Taking   ACEi/ARB: Currently Taking     Adverse Drug Reactions  Medication tolerability: Tolerating with no to minimal ADRs  Medication plan: Continue therapy with normal follow-up  Plan for ADR Management: None    Adherence, Self-Administration, and Current Therapy Problems  Adherence related to the patient's specialty therapy was discussed with the patient. The Adherence segment of this outreach has been reviewed and updated.     Adherence Questions  Linked Medication(s) Assessed: Dapagliflozin Propanediol, Semaglutide  On average, how many doses/injections does the patient miss per month?: 0  What are the identified reasons for non-adherence or missed doses? : no problems identfied  What is the estimated medication adherence level?: %  Based on the patient/caregiver response and refill history, does this patient require an MTP to track adherence improvements?: no    Additional  Barriers to Patient Self-Administration: None  Methods for Supporting Patient Self-Administration: N/A    Open Medication Therapy Problems  Type 2 diabetes mellitus with microalbuminuria, without long-term current use of insulin    Current Medication: Semaglutide, 2 MG/DOSE, (Ozempic, 2 MG/DOSE,) 8 MG/3ML solution pen-injector (Discontinued)   Rationale: Undesirable effect - Adverse medication event - Safety   Recommendation: Decrease Dose - Ozempic (1 MG/DOSE) 4 MG/3ML solution pen-injector   Note: 7/11/23 (9:19a) Patient recently contacted prescriber via [employer] (7/9/23) to report significant nausea and some vomiting with Ozempic (patient is on 12th week of therapy). Dr. Schneider recommended decreasing Ozempic to 1 mg weekly. Dispensing Ozempic 1 mg to patient today along with other refills, aligned follow-up to check in on resolution of nausea with next refill outreach.            Goals of Therapy  Goals related to the patient's specialty therapy were discussed with the patient. The Patient Goals segment of this outreach has been reviewed and updated.   Goals Addressed Today        Specialty Pharmacy General Goal      A1C < 7 %     7/24/2023:  A1c has decreased to 6.6% from 7.5% during previous visit.  Goal completed.  Will now focus on maintaining A1c <7%.  CR    10/24/23: A1C steady at 6.6%, on track today with no changes to therapy at this time.              Quality of Life Assessment   Quality of Life related to the patient's enrollment in the patient management program and services provided was discussed with the patient. The QOL segment of this outreach has been reviewed and updated.  Quality of Life Improvement Scale: Significantly better    Reassessment Plan & Follow-Up  1. Medication Therapy Changes: None Today  2. Related Plans, Therapy Recommendations, or Issues to Be Addressed: None  3. Pharmacist to perform regular assessments no more than (6) months from the previous assessment.  4. Care  Coordinator to set up future refill outreaches, coordinate prescription delivery, and escalate clinical questions to pharmacist.    Attestation  Therapeutic appropriateness: Appropriate   I attest the patient was actively involved in and has agreed to the above plan of care.  If the prescribed therapy is at any point deemed not appropriate based on the current or future assessments, a consultation will be initiated with the patient's specialty care provider to determine the best course of action. The revised plan of therapy will be documented along with any required assessments and/or additional patient education provided.     Celine Hussein, Shreya, BCACP  Clinical Specialty Pharmacist, Endocrinology  10/24/2023  11:13 EDT

## 2023-10-24 NOTE — PROGRESS NOTES
"     Office Note      Date: 10/24/2023  Patient Name: George Tolentino  MRN: [id number]  : 1980    Chief Complaint   Patient presents with    Diabetes       History of Present Illness:   George Tolentino is a 43 y.o. male who presents for Diabetes type 2. Diagnosed in: . Treated in past with oral agents, insulin and trulicity. Current treatments: farxiga, glimepiride, metformin and ozempic.  Number of insulin shots per day: none. Checks blood sugar 2 times a week. Has low blood sugar: rare. Aspirin use: No. Statin use: Yes. ACE-I/ARB use: Yes. Changes in health since last visit: none. Last eye exam: 3/2023.      Subjective      Diabetic Complications:  Eyes: No  Kidneys: Yes - microalbuminuria  Feet: No  Heart: No    Diet and Exercise:  Meals per day: 3  Minutes of exercise per week: 0 mins.    Review of Systems:   Review of Systems   Constitutional: Negative.    Cardiovascular: Negative.    Gastrointestinal: Negative.    Endocrine: Negative.        The following portions of the patient's history were reviewed and updated as appropriate: allergies, current medications, past family history, past medical history, past social history, past surgical history, and problem list.    Objective     Visit Vitals  /70   Pulse 83   Ht 188 cm (74\")   Wt 101 kg (223 lb)   SpO2 98%   BMI 28.63 kg/m²       Physical Exam:  Physical Exam  Constitutional:       Appearance: Normal appearance.   Neurological:      Mental Status: He is alert.         Labs:    HbA1c  Lab Results   Component Value Date    HGBA1C 6.6 (A) 10/24/2023       CMP  Lab Results   Component Value Date    GLUCOSE 160 (H) 2023    BUN 19 2023    CREATININE 0.74 (L) 2023    EGFRIFNONA 144 2021    BCR 25.7 (H) 2023    K 4.4 2023    CO2 25.4 2023    CALCIUM 10.2 2023    AST 31 2023    ALT 51 (H) 2023        Lipid Panel  Lab Results   Component Value Date    HDL 39 (L) 2023    LDL 58 2023 "    TRIG 126 03/02/2023        TSH  Lab Results   Component Value Date    TSH 1.070 03/02/2023        Hemoglobin A1C  Lab Results   Component Value Date    HGBA1C 6.6 (A) 10/24/2023        Microalbumin/Creatinine  Lab Results   Component Value Date    MALBCRERATIO 191.2 03/02/2023    MICROALBUR 11.7 03/02/2023           Assessment / Plan      Assessment & Plan:  Diagnoses and all orders for this visit:    1. Type 2 diabetes mellitus with hyperglycemia, without long-term current use of insulin (Primary)  Assessment & Plan:  Diabetes is unchanged.   Continue current treatment regimen.  Diabetes will be reassessed in 3 months.    FreeStyle Jeramy 3 CGM was downloaded today.  Data was reviewed from 10/11/23 to 10/24/23.  This showed fairly good glucose control with occ postprandial spikes.  No patterns for medication adjustment at this time.    Orders:  -     POC Glucose, Blood  -     POC Glycosylated Hemoglobin (Hb A1C)    2. Type 2 diabetes mellitus with microalbuminuria, without long-term current use of insulin  Assessment & Plan:  Continue ACE-I and SGLT-2 inhibitor.  Check microalbumin today.  If still elevated, consider adding Kerendia.    Orders:  -     Microalbumin / Creatinine Urine Ratio - Urine, Clean Catch; Future    3. Mixed hyperlipidemia  Assessment & Plan:  Continue statin.  Plan to check lipids next visit.      4. Infertility male  Assessment & Plan:  He and his wife are attempting pregnancy without success.  He asks about doing semen analysis.    Orders:  -     Semen Analysis - Semen, Voided; Future      Current Outpatient Medications   Medication Instructions    cholecalciferol (VITAMIN D3) 1,000 Units, Oral, Daily    Continuous Blood Gluc Sensor (FreeStyle Jeramy 3 Sensor) misc Change Sensor Every 14 (Fourteen) Days, as directed.    dapagliflozin Propanediol (Farxiga) 10 MG tablet Take 1 tablet by mouth once daily.    glimepiride (AMARYL) 8 mg, Oral, Every Morning Before Breakfast, **Replaces  Glipizide**    lisinopril (PRINIVIL,ZESTRIL) 40 mg, Oral, Daily, **Note strength of tablet changed to 40mg, only take 1 tablet daily**    metFORMIN ER (GLUCOPHAGE-XR) 1,000 mg, Oral, 2 Times Daily    Ozempic (1 MG/DOSE) 1 mg, Subcutaneous, Weekly    rosuvastatin (CRESTOR) 40 mg, Oral, Daily      Return in about 3 months (around 1/24/2024) for Recheck with A1c, CMP, lipid, TSH, microalbumin, foot exam.    Franco Schneider MD   10/24/2023

## 2023-11-01 ENCOUNTER — SPECIALTY PHARMACY (OUTPATIENT)
Dept: ENDOCRINOLOGY | Facility: CLINIC | Age: 43
End: 2023-11-01
Payer: COMMERCIAL

## 2023-11-01 NOTE — PROGRESS NOTES
" Specialty Pharmacy Patient Management Program  Endocrinology Refill Outreach      George \"Maggie" is a 43 y.o. male contacted today regarding refills of his medication(s).    Specialty medication(s) and dose(s) confirmed: Farxiga, Ozempic  Other medications being refilled: Jeramy Sensors    Refill Questions      Flowsheet Row Most Recent Value   Changes to allergies? No   Changes to medications? No   New conditions since last clinic visit No   Unplanned office visit, urgent care, ED, or hospital admission in the last 4 weeks  No   How does patient/caregiver feel medication is working? Very good   Financial problems or insurance changes  No   Since the previous refill, were any specialty medication doses or scheduled injections missed or delayed?  No   Does this patient require a clinical escalation to a pharmacist? No            Delivery Questions      Flowsheet Row Most Recent Value   Delivery method [employer]Ex   Delivery address correct? Yes   Delivery phone number [phone]   Preferred delivery time? Anytime   Medication(s) being filled and delivered Dapagliflozin Propanediol, Continuous Blood Gluc Sensor, Semaglutide   Is there any medication that is due not being filled? No   Supplies needed? No supplies needed   Cooler needed? Yes   Do any medications need mixed or dated? No   Copay form of payment Credit card on file   Additional comments Copay $99.97   Questions or concerns for the pharmacist? No   Are any medications first time fills? No   Shipment status Cooler packed, Delivery complete            Medication Adherence          Adherence tools used: patient uses a pill box to manage medications      Support network for adherence: healthcare provider                  Follow-up: 28 Days, Jeramy Sensors, Ozempic, 30 Days, Edilberto Johnston, Care Coordinator   Endocrinology  11/1/2023  13:07 EDT        "

## 2023-11-06 DIAGNOSIS — N46.9 INFERTILITY MALE: Primary | ICD-10-CM

## 2023-11-28 ENCOUNTER — SPECIALTY PHARMACY (OUTPATIENT)
Dept: ENDOCRINOLOGY | Facility: CLINIC | Age: 43
End: 2023-11-28
Payer: COMMERCIAL

## 2023-11-28 RX ORDER — ROSUVASTATIN CALCIUM 40 MG/1
40 TABLET, COATED ORAL DAILY
Qty: 90 TABLET | Refills: 3 | Status: SHIPPED | OUTPATIENT
Start: 2023-11-28

## 2023-11-28 NOTE — PROGRESS NOTES
" Specialty Pharmacy Patient Management Program  Endocrinology Refill Outreach      George \"Maggie" is a 43 y.o. male contacted today regarding refills of his medication(s).    Specialty medication(s) and dose(s) confirmed: Ozempic  Other medication(s) being refilled: Farxiga, Jeramy Sensors, Rosuvastatin     Refill Questions      Flowsheet Row Most Recent Value   Changes to allergies? No   Changes to medications? No   New conditions since last clinic visit No   Unplanned office visit, urgent care, ED, or hospital admission in the last 4 weeks  No   How does patient/caregiver feel medication is working? Very good   Financial problems or insurance changes  No   Since the previous refill, were any specialty medication doses or scheduled injections missed or delayed?  No   Does this patient require a clinical escalation to a pharmacist? No          Delivery Questions      Flowsheet Row Most Recent Value   Delivery method [employer]Ex   Delivery address correct? Yes   Delivery phone number [phone]   Preferred delivery time? Anytime   Number of medications in delivery 4   Medication(s) being filled and delivered Rosuvastatin Calcium, Continuous Blood Gluc Sensor, Dapagliflozin Propanediol, Semaglutide   Doses left of specialty medications 1 week approx   Is there any medication that is due not being filled? No   Supplies needed? No supplies needed   Cooler needed? Yes   Do any medications need mixed or dated? No   Copay form of payment Credit card on file   Additional comments Copay $120.36   Questions or concerns for the pharmacist? No   Are any medications first time fills? No            Follow-Up: 90d Rosuvastatin; 28-30d Others    Celine Hussein, PharmD, BCACP  Clinical Specialty Pharmacist, Endocrinology  11/28/2023  15:32 EST    "

## 2023-11-28 NOTE — TELEPHONE ENCOUNTER
Specialty Pharmacy Patient Management Program  Prescription Refill Request     Patient currently fills medications at  Pharmacy. Needing refill(s) on the following:      Requested Prescriptions     Pending Prescriptions Disp Refills    rosuvastatin (CRESTOR) 40 MG tablet 90 tablet 3     Sig: Take 1 tablet by mouth Daily.     Pended for endocrinology provider, Dr. Schneider, to review and approve if appropriate.     Celine Hussein, PharmD, BCACP  Clinical Specialty Pharmacist, Endocrinology  11/28/2023  12:58 EST

## 2023-12-21 DIAGNOSIS — N46.9 INFERTILITY MALE: Primary | ICD-10-CM

## 2023-12-21 DIAGNOSIS — N46.9 INFERTILITY MALE: ICD-10-CM

## 2023-12-27 ENCOUNTER — SPECIALTY PHARMACY (OUTPATIENT)
Dept: ENDOCRINOLOGY | Facility: CLINIC | Age: 43
End: 2023-12-27
Payer: COMMERCIAL

## 2023-12-27 NOTE — PROGRESS NOTES
" Specialty Pharmacy Patient Management Program  Endocrinology Refill Outreach      George \"Maggie" is a 43 y.o. male contacted today regarding refills of his medication(s).    Specialty medication(s) and dose(s) confirmed: Farxiga, Ozempic  Other medication(s) being refilled: Jeramy Sensors, Glimepiride, Lisinopril, Metformin    Refill Questions      Flowsheet Row Most Recent Value   Changes to allergies? No   Changes to medications? No   New conditions or infections since last clinic visit No   Unplanned office visit, urgent care, ED, or hospital admission in the last 4 weeks  No   How does patient/caregiver feel medication is working? Very good   Financial problems or insurance changes  No   Since the previous refill, were any specialty medication doses or scheduled injections missed or delayed?  No   Does this patient require a clinical escalation to a pharmacist? No          Delivery Questions      Flowsheet Row Most Recent Value   Delivery method FedEx   Delivery address verified with patient/caregiver? Yes   Delivery address Home   Number of medications in delivery 6   Medication(s) being filled and delivered Continuous Blood Gluc Sensor, Metformin Hcl (Biguanides), Lisinopril (Ace Inhibitors), Glimepiride, Dapagliflozin Propanediol, Semaglutide   Doses left of specialty medications 1 week approx   Copay verified? Yes   Copay amount $150.42   Copay form of payment Credit/debit on file            Medication Adherence          Adherence tools used: patient uses a pill box to manage medications      Support network for adherence: healthcare provider               Follow-Up: 28d Ozempic, Jeramy Sensors; 30d Farxiga, 90d Others    Celine Hussein, PharmD, BCACP  Clinical Specialty Pharmacist, Endocrinology  12/27/2023  13:27 EST    "

## 2024-01-22 RX ORDER — BLOOD-GLUCOSE SENSOR
1 EACH MISCELLANEOUS
Qty: 6 EACH | Refills: 3 | Status: SHIPPED | OUTPATIENT
Start: 2024-01-22

## 2024-01-22 NOTE — TELEPHONE ENCOUNTER
Specialty Pharmacy Patient Management Program  Prescription Refill Request     Patient currently fills medications at  Pharmacy. Needing refill(s) on the following:      Requested Prescriptions     Pending Prescriptions Disp Refills    Continuous Blood Gluc Sensor (FreeStyle Jeramy 3 Sensor) misc 6 each 3     Sig: Change Sensor Every 14 (Fourteen) Days, as directed.     Pended for endocrinology provider, Dr. Schneider, to review and approve if appropriate.     Vic DaviesD, BCACP  Clinical Specialty Pharmacist, Endocrinology  1/22/2024  10:24 EST

## 2024-01-26 ENCOUNTER — SPECIALTY PHARMACY (OUTPATIENT)
Dept: ENDOCRINOLOGY | Facility: CLINIC | Age: 44
End: 2024-01-26
Payer: COMMERCIAL

## 2024-01-26 NOTE — PROGRESS NOTES
" Specialty Pharmacy Patient Management Program  Endocrinology Refill Outreach      George \"Maggie" is a 43 y.o. male contacted today regarding refills of his medication(s).    Specialty medication(s) and dose(s) confirmed: Farxiga, Ozempic  Other medication(s) being refilled: Jeramy 3    Refill Questions      Flowsheet Row Most Recent Value   Changes to allergies? No   Changes to medications? No   New conditions or infections since last clinic visit No   Unplanned office visit, urgent care, ED, or hospital admission in the last 4 weeks  No   How does patient/caregiver feel medication is working? Very good   Financial problems or insurance changes  No   Since the previous refill, were any specialty medication doses or scheduled injections missed or delayed?  No   Does this patient require a clinical escalation to a pharmacist? No          Delivery Questions      Flowsheet Row Most Recent Value   Delivery method [employer]Ex   Delivery address verified with patient/caregiver? Yes   Delivery address Home   Number of medications in delivery 3   Medication(s) being filled and delivered Continuous Blood Gluc Sensor, Dapagliflozin Propanediol, Semaglutide   Copay verified? Yes   Copay amount $99.97   Copay form of payment Credit/debit on file            Follow-Up: 28d      Linwood Morales, PharmD, MPH  Clinical Specialty Pharmacist, Endocrinology  1/26/2024  09:46 EST    "

## 2024-02-19 ENCOUNTER — TELEPHONE (OUTPATIENT)
Dept: ENDOCRINOLOGY | Facility: CLINIC | Age: 44
End: 2024-02-19
Payer: COMMERCIAL

## 2024-02-19 NOTE — TELEPHONE ENCOUNTER
"Spoke with pt regarding results of semen analysis.  Improvement in overall values was noted compared to prior analysis but decrease in normal morphology was seen.  Possibility of IUI was mentioned.  Pt reported his wife is undergoing fertility evaluation at this time as well.  They will discuss his results with her fertility specialist.        ----- Message from Iva Frye MA sent at 2/15/2024 12:08 PM EST -----  Regarding: FW: Analysis Results   Contact: [phone]    ----- Message -----  From: George Tolentino \"Nicola\"  Sent: 2/14/2024   5:39 PM EST  To: e [facility] [place]  Subject: Analysis Results                                 Hi Dr. Schneider,  Will you please call me to discuss my results and they next steps.   Thanks,  Nicola Tolentino    "

## 2024-02-26 ENCOUNTER — SPECIALTY PHARMACY (OUTPATIENT)
Dept: ENDOCRINOLOGY | Facility: CLINIC | Age: 44
End: 2024-02-26
Payer: COMMERCIAL

## 2024-02-26 NOTE — PROGRESS NOTES
" Specialty Pharmacy Patient Management Program  Endocrinology Refill Outreach      George \"Maggie" is a 43 y.o. male contacted today regarding refills of his medication(s).    Specialty medication(s) and dose(s) confirmed: Farxiga and Ozempic  Other medication(s) being refilled: jonas sensors    Refill Questions      Flowsheet Row Most Recent Value   Changes to allergies? No   Changes to medications? No   New conditions or infections since last clinic visit No   Unplanned office visit, urgent care, ED, or hospital admission in the last 4 weeks  No   How does patient/caregiver feel medication is working? Very good   Financial problems or insurance changes  No   Since the previous refill, were any specialty medication doses or scheduled injections missed or delayed?  No   Does this patient require a clinical escalation to a pharmacist? No          Delivery Questions      Flowsheet Row Most Recent Value   Delivery method [employer]Ex   Delivery address verified with patient/caregiver? Yes   Delivery address Home   Number of medications in delivery 3   Medication(s) being filled and delivered Semaglutide, Continuous Blood Gluc Sensor, Dapagliflozin Propanediol   Doses left of specialty medications 4 days of Farxiga, and took last dose of Ozempic this week.   Copay verified? Yes   Copay amount SPRX - $99.97 last fill and consent obtained for potential copay differences.   Copay form of payment Credit/debit on file            Follow-Up: 28-30 days    Raysa Barnes CPhT  Pharmacy Care Coordinator, Endocrinology  2/26/2024  14:34 EST          "

## 2024-03-18 ENCOUNTER — SPECIALTY PHARMACY (OUTPATIENT)
Dept: ENDOCRINOLOGY | Facility: CLINIC | Age: 44
End: 2024-03-18
Payer: COMMERCIAL

## 2024-03-18 ENCOUNTER — OFFICE VISIT (OUTPATIENT)
Dept: ENDOCRINOLOGY | Facility: CLINIC | Age: 44
End: 2024-03-18
Payer: COMMERCIAL

## 2024-03-18 VITALS
WEIGHT: 233 LBS | HEIGHT: 74 IN | BODY MASS INDEX: 29.9 KG/M2 | HEART RATE: 72 BPM | DIASTOLIC BLOOD PRESSURE: 60 MMHG | OXYGEN SATURATION: 98 % | SYSTOLIC BLOOD PRESSURE: 120 MMHG

## 2024-03-18 DIAGNOSIS — E11.65 TYPE 2 DIABETES MELLITUS WITH HYPERGLYCEMIA, WITHOUT LONG-TERM CURRENT USE OF INSULIN: Primary | ICD-10-CM

## 2024-03-18 DIAGNOSIS — R80.9 TYPE 2 DIABETES MELLITUS WITH MICROALBUMINURIA, WITHOUT LONG-TERM CURRENT USE OF INSULIN: ICD-10-CM

## 2024-03-18 DIAGNOSIS — N46.9 INFERTILITY MALE: ICD-10-CM

## 2024-03-18 DIAGNOSIS — E78.2 MIXED HYPERLIPIDEMIA: ICD-10-CM

## 2024-03-18 DIAGNOSIS — E11.29 TYPE 2 DIABETES MELLITUS WITH MICROALBUMINURIA, WITHOUT LONG-TERM CURRENT USE OF INSULIN: ICD-10-CM

## 2024-03-18 LAB
ALBUMIN SERPL-MCNC: 4.5 G/DL (ref 3.5–5.2)
ALBUMIN UR-MCNC: 3.8 MG/DL
ALBUMIN/GLOB SERPL: 1.9 G/DL
ALP SERPL-CCNC: 55 U/L (ref 39–117)
ALT SERPL W P-5'-P-CCNC: 45 U/L (ref 1–41)
ANION GAP SERPL CALCULATED.3IONS-SCNC: 10.4 MMOL/L (ref 5–15)
AST SERPL-CCNC: 32 U/L (ref 1–40)
BILIRUB SERPL-MCNC: 0.3 MG/DL (ref 0–1.2)
BUN SERPL-MCNC: 15 MG/DL (ref 6–20)
BUN/CREAT SERPL: 21.7 (ref 7–25)
CALCIUM SPEC-SCNC: 9.2 MG/DL (ref 8.6–10.5)
CHLORIDE SERPL-SCNC: 103 MMOL/L (ref 98–107)
CHOLEST SERPL-MCNC: 90 MG/DL (ref 0–200)
CO2 SERPL-SCNC: 22.6 MMOL/L (ref 22–29)
CREAT SERPL-MCNC: 0.69 MG/DL (ref 0.76–1.27)
CREAT UR-MCNC: 50.5 MG/DL
EGFRCR SERPLBLD CKD-EPI 2021: 119.2 ML/MIN/1.73
EXPIRATION DATE: ABNORMAL
EXPIRATION DATE: ABNORMAL
GLOBULIN UR ELPH-MCNC: 2.4 GM/DL
GLUCOSE BLDC GLUCOMTR-MCNC: 134 MG/DL (ref 70–130)
GLUCOSE SERPL-MCNC: 112 MG/DL (ref 65–99)
HBA1C MFR BLD: 6.5 % (ref 4.5–5.7)
HDLC SERPL-MCNC: 34 MG/DL (ref 40–60)
LDLC SERPL CALC-MCNC: 37 MG/DL (ref 0–100)
LDLC/HDLC SERPL: 1.06 {RATIO}
Lab: ABNORMAL
Lab: ABNORMAL
MICROALBUMIN/CREAT UR: 75.2 MG/G (ref 0–29)
POTASSIUM SERPL-SCNC: 3.7 MMOL/L (ref 3.5–5.2)
PROT SERPL-MCNC: 6.9 G/DL (ref 6–8.5)
SODIUM SERPL-SCNC: 136 MMOL/L (ref 136–145)
TRIGL SERPL-MCNC: 100 MG/DL (ref 0–150)
TSH SERPL DL<=0.05 MIU/L-ACNC: 0.91 UIU/ML (ref 0.27–4.2)
VLDLC SERPL-MCNC: 19 MG/DL (ref 5–40)

## 2024-03-18 PROCEDURE — 82043 UR ALBUMIN QUANTITATIVE: CPT | Performed by: INTERNAL MEDICINE

## 2024-03-18 PROCEDURE — 82570 ASSAY OF URINE CREATININE: CPT | Performed by: INTERNAL MEDICINE

## 2024-03-18 PROCEDURE — 83036 HEMOGLOBIN GLYCOSYLATED A1C: CPT | Performed by: INTERNAL MEDICINE

## 2024-03-18 PROCEDURE — 84443 ASSAY THYROID STIM HORMONE: CPT | Performed by: INTERNAL MEDICINE

## 2024-03-18 PROCEDURE — 95251 CONT GLUC MNTR ANALYSIS I&R: CPT | Performed by: INTERNAL MEDICINE

## 2024-03-18 PROCEDURE — 99214 OFFICE O/P EST MOD 30 MIN: CPT | Performed by: INTERNAL MEDICINE

## 2024-03-18 PROCEDURE — 80061 LIPID PANEL: CPT | Performed by: INTERNAL MEDICINE

## 2024-03-18 PROCEDURE — 80053 COMPREHEN METABOLIC PANEL: CPT | Performed by: INTERNAL MEDICINE

## 2024-03-18 RX ORDER — ROSUVASTATIN CALCIUM 40 MG/1
40 TABLET, COATED ORAL DAILY
Qty: 90 TABLET | Refills: 3 | Status: SHIPPED | OUTPATIENT
Start: 2024-03-18

## 2024-03-18 NOTE — PROGRESS NOTES
"     Office Note      Date: 2024  Patient Name: George Tolentino  MRN: [id number]  : 1980    Chief Complaint   Patient presents with    Diabetes     Type II       History of Present Illness:   Geogre Tolentino is a 43 y.o. male who presents for Diabetes type 2. Diagnosed in: . Treated in past with oral agents, insulin and trulicity. Current treatments: farxiga, glimepiride, metformin and ozempic.  Number of insulin shots per day: none. Checks blood sugar 2 times a week. Has low blood sugar: rare. Aspirin use: No. Statin use: Yes. ACE-I/ARB use: Yes. Changes in health since last visit: none. Last eye exam: 3/2023.       Subjective      Diabetic Complications:  Eyes: No  Kidneys: Yes - microalbuminuria  Feet: No  Heart: No    Diet and Exercise:  Meals per day: 3  Minutes of exercise per week: 0 mins.    Review of Systems:   Review of Systems   Constitutional: Negative.    Cardiovascular: Negative.    Gastrointestinal: Negative.         Heartburn/Reflux   Endocrine: Negative.        The following portions of the patient's history were reviewed and updated as appropriate: allergies, current medications, past family history, past medical history, past social history, past surgical history, and problem list.    Objective     Visit Vitals  /60 (BP Location: Left arm, Patient Position: Sitting, Cuff Size: Adult)   Pulse 72   Ht 188 cm (74\")   Wt 106 kg (233 lb)   SpO2 98%   BMI 29.92 kg/m²       Physical Exam:  Physical Exam  Constitutional:       Appearance: Normal appearance.   Cardiovascular:      Pulses:           Dorsalis pedis pulses are 2+ on the right side and 2+ on the left side.        Posterior tibial pulses are 2+ on the right side and 2+ on the left side.   Feet:      Right foot:      Protective Sensation: 5 sites tested.  5 sites sensed.      Skin integrity: Skin integrity normal.      Toenail Condition: Right toenails are normal.      Left foot:      Protective Sensation: 5 sites tested.  5 " sites sensed.      Skin integrity: Skin integrity normal.      Toenail Condition: Left toenails are normal.   Neurological:      Mental Status: He is alert.         Labs:    HbA1c  Lab Results   Component Value Date    HGBA1C 6.5 (A) 03/18/2024       CMP  Lab Results   Component Value Date    GLUCOSE 112 (H) 03/18/2024    BUN 15 03/18/2024    CREATININE 0.69 (L) 03/18/2024    EGFRIFNONA 144 11/11/2021    BCR 21.7 03/18/2024    K 3.7 03/18/2024    CO2 22.6 03/18/2024    CALCIUM 9.2 03/18/2024    AST 32 03/18/2024    ALT 45 (H) 03/18/2024        Lipid Panel  Lab Results   Component Value Date    HDL 34 (L) 03/18/2024    LDL 37 03/18/2024    TRIG 100 03/18/2024        TSH  Lab Results   Component Value Date    TSH 0.905 03/18/2024        Hemoglobin A1C  Lab Results   Component Value Date    HGBA1C 6.5 (A) 03/18/2024        Microalbumin/Creatinine  Lab Results   Component Value Date    MALBCRERATIO 75.2 (H) 03/18/2024    MICROALBUR 3.8 03/18/2024           Assessment / Plan      Assessment & Plan:  Diagnoses and all orders for this visit:    1. Type 2 diabetes mellitus with hyperglycemia, without long-term current use of insulin (Primary)  Assessment & Plan:  Diabetes is stable.   Continue current treatment regimen.  Diabetes will be reassessed in 6 months.    FreeStyle Jeramy 3 CGM was downloaded today.  Data was reviewed from 3/5/24 to 3/18/24.  This showed overall good glucose control with some increase in the AM consistent with ousmane phenomenon.  No patterns for medication adjustment at this time.    Orders:  -     POC Glucose, Blood  -     POC Glycosylated Hemoglobin (Hb A1C)  -     Comprehensive Metabolic Panel; Future  -     Lipid Panel; Future  -     Microalbumin / Creatinine Urine Ratio - Urine, Clean Catch; Future  -     TSH; Future  -     Comprehensive Metabolic Panel  -     Lipid Panel  -     Microalbumin / Creatinine Urine Ratio - Urine, Clean Catch  -     TSH    2. Mixed hyperlipidemia  Assessment &  Plan:  Continue statin.  Check lipids.      3. Type 2 diabetes mellitus with microalbuminuria, without long-term current use of insulin  Assessment & Plan:  Continue ACE-I and SGLT-2 inhibitor.  Check microalbumin today.      4. Infertility male  Assessment & Plan:  Has had semen analysis x 2 with low sperm count and decrease in normal morphology.  His wife us undergoing fertility analysis also.  IUI may be an option.      Other orders  -     rosuvastatin (CRESTOR) 40 MG tablet; Take 1 tablet by mouth Daily.  Dispense: 90 tablet; Refill: 3      Current Outpatient Medications   Medication Instructions    cholecalciferol 1,000 Units, Oral, Daily    Continuous Blood Gluc Sensor (FreeStyle Jeramy 3 Sensor) misc Change Sensor Every 14 (Fourteen) Days, as directed.    dapagliflozin Propanediol (Farxiga) 10 MG tablet Take 1 tablet by mouth once daily.    glimepiride (AMARYL) 8 mg, Oral, Every Morning Before Breakfast, **Replaces Glipizide**    lisinopril (PRINIVIL,ZESTRIL) 40 mg, Oral, Daily, **Note strength of tablet changed to 40mg, only take 1 tablet daily**    metFORMIN ER (GLUCOPHAGE-XR) 1,000 mg, Oral, 2 Times Daily    Ozempic (1 MG/DOSE) 1 mg, Subcutaneous, Weekly    rosuvastatin (CRESTOR) 40 mg, Oral, Daily      Return in about 3 months (around 6/18/2024) for Recheck with A1c, microalbumin.    Electronically signed by: Franco Schneider MD  03/18/2024

## 2024-03-18 NOTE — PROGRESS NOTES
Specialty Pharmacy Patient Management Program  Endocrinology Reassessment     George Tolentino was referred by an Endocrinology provider to the Endocrinology Patient Management program offered by [facility] for Type 2 Diabetes. A follow-up outreach was conducted, including assessment of continued therapy appropriateness, medication adherence, and side effect incidence and management for Farxiga and Ozempic.    Changes to Insurance Coverage or Financial Support  No changes at this time    Relevant Past Medical History and Comorbidities  Relevant medical history and concomitant health conditions were discussed with the patient. The patient's chart has been reviewed for relevant past medical history and comorbid health conditions and updated as necessary.   Past Medical History:   Diagnosis Date    Anxiety     Depression     Diabetes mellitus     DM type 2 causing renal disease     GERD (gastroesophageal reflux disease)     Hypertension     Mixed hyperlipidemia     Mood disorder     Obesity     body mass index 30+-obesity    Type 2 diabetes mellitus, uncontrolled      Social History     Socioeconomic History    Marital status:    Tobacco Use    Smoking status: Former     Types: Cigars     Start date: 2010    Smokeless tobacco: Never    Tobacco comments:     1 per week   Vaping Use    Vaping status: Former    Substances: THC    Devices: Pre-filled or refillable cartridge   Substance and Sexual Activity    Alcohol use: Yes     Comment: 10 drinks/week    Drug use: Yes     Types: Marijuana    Sexual activity: Defer     Problem list reviewed by Linwood Morales, PharmD on 3/18/2024 at  9:37 AM    Hospitalizations and Urgent Care Since Last Assessment  ED Visits, Admissions, or Hospitalizations: None  Urgent Office Visits: None    Allergies  Known allergies and reactions were discussed with the patient. The patient's chart has been reviewed for allergy information and updated as necessary.   No  Known Allergies  Allergies reviewed by Linwood Morales, PharmD on 3/18/2024 at  9:37 AM    Relevant Laboratory Values  Relevant laboratory values were discussed with the patient. The following specialty medication dose adjustment(s) are recommended: Pt maintaining A1c WNL (at goal), decreased slightly from 6.6% to 6.5% during today's visit.  No changes at this time, will continue to follow.    A1C Last 3 Results          7/24/2023    07:50 10/24/2023    08:05 3/18/2024    08:23   HGBA1C Last 3 Results   Hemoglobin A1C 6.6  6.6  6.5      Lab Results   Component Value Date    HGBA1C 6.5 (A) 03/18/2024     Lab Results   Component Value Date    GLUCOSE 160 (H) 03/02/2023    CALCIUM 10.2 03/02/2023     (L) 03/02/2023    K 4.4 03/02/2023    CO2 25.4 03/02/2023    CL 99 03/02/2023    BUN 19 03/02/2023    CREATININE 0.74 (L) 03/02/2023    EGFRIFNONA 144 11/11/2021    BCR 25.7 (H) 03/02/2023    ANIONGAP 10.6 03/02/2023     Lab Results   Component Value Date    CHOL 120 03/02/2023    TRIG 126 03/02/2023    HDL 39 (L) 03/02/2023    LDL 58 03/02/2023     Microalbumin          10/24/2023    08:21   Microalbumin   Microalbumin, Urine 3.4      Current Medication List  This medication list has been reviewed with the patient and evaluated for any interactions or necessary modifications/recommendations, and updated to include all prescription medications, OTC medications, and supplements the patient is currently taking.  This list reflects what is contained in the patient's profile, which has also been marked as reviewed to communicate to other providers it is the most up to date version of the patient's current medication therapy.     Current Outpatient Medications:     cholecalciferol (VITAMIN D3) 25 MCG (1000 UT) tablet, Take 1 tablet by mouth Daily., Disp: , Rfl:     Continuous Blood Gluc Sensor (FreeStyle Jeramy 3 Sensor) misc, Change Sensor Every 14 (Fourteen) Days, as directed., Disp: 6 each, Rfl: 3    dapagliflozin  Propanediol (Farxiga) 10 MG tablet, Take 1 tablet by mouth once daily., Disp: 90 tablet, Rfl: 3    glimepiride (Amaryl) 4 MG tablet, Take 2 tablets by mouth Every Morning Before Breakfast. **Replaces Glipizide**, Disp: 180 tablet, Rfl: 3    lisinopril (PRINIVIL,ZESTRIL) 40 MG tablet, Take 1 tablet by mouth Daily. **Note strength of tablet changed to 40mg, only take 1 tablet daily**, Disp: 90 tablet, Rfl: 3    metFORMIN ER (GLUCOPHAGE-XR) 500 MG 24 hr tablet, Take 2 tablets by mouth 2 (Two) Times a Day., Disp: 360 tablet, Rfl: 3    rosuvastatin (CRESTOR) 40 MG tablet, Take 1 tablet by mouth Daily., Disp: 90 tablet, Rfl: 3    Semaglutide, 1 MG/DOSE, (Ozempic, 1 MG/DOSE,) 4 MG/3ML solution pen-injector, Inject 1 mg under the skin into the appropriate area as directed 1 (One) Time Per Week., Disp: 9 mL, Rfl: 3    Medicines reviewed by Linwood Morales, PharmD on 3/18/2024 at  9:37 AM    Drug Interactions  No Clinically Significant DDIs Were Identified at Present Time Upon Marking Medications Reviewed    Recommended Medications Assessment  Aspirin: Not Taking Currently  Statin: Currently Taking   ACEi/ARB: Currently Taking     Adverse Drug Reactions  Medication tolerability: Tolerating with no to minimal ADRs  Medication plan: Continue therapy with normal follow-up  Plan for ADR Management: N/A at this time.  Pt reports they are tolerating therapy well.    Adherence, Self-Administration, and Current Therapy Problems  Adherence related to the patient's specialty therapy was discussed with the patient. The Adherence segment of this outreach has been reviewed and updated.     Adherence Questions  Linked Medication(s) Assessed: Dapagliflozin Propanediol, Semaglutide  On average, how many doses/injections does the patient miss per month?: 0  What are the identified reasons for non-adherence or missed doses? : no problems identified  What is the estimated medication adherence level?: %  Based on the patient/caregiver  response and refill history, does this patient require an MTP to track adherence improvements?: no    Additional Barriers to Patient Self-Administration: No known barriers at this time  Methods for Supporting Patient Self-Administration: Continued  enrollment    Open Medication Therapy Problems  No medication therapy recommendations to display    Goals of Therapy  Goals related to the patient's specialty therapy were discussed with the patient. The Patient Goals segment of this outreach has been reviewed and updated.   Goals Addressed Today        Specialty Pharmacy General Goal      A1C < 7 %     7/24/2023:  A1c has decreased to 6.6% from 7.5% during previous visit.  Goal completed.  Will now focus on maintaining A1c <7%.  CR    10/24/2023:  A1C steady at 6.6%, on track today with no changes to therapy at this time. KL     3/18/2024:  Pt maintaining A1c WNL (at goal), decreased slightly from 6.6% to 6.5% during today's visit.  No changes at this time, will continue to follow.  CR              Quality of Life Assessment   Quality of Life related to the patient's enrollment in the patient management program and services provided was discussed with the patient. The QOL segment of this outreach has been reviewed and updated.  Quality of Life Improvement Scale: 10-Significantly better    Reassessment Plan & Follow-Up  1. Medication Therapy Changes: Pt maintaining A1c WNL (at goal), decreased slightly from 6.6% to 6.5% during today's visit.  No changes at this time, will continue to follow.  2. Related Plans, Therapy Recommendations, or Issues to Be Addressed: None  3. Pharmacist to perform regular assessments no more than (6) months from the previous assessment.  4. Care Coordinator to set up future refill outreaches, coordinate prescription delivery, and escalate clinical questions to pharmacist.    Attestation  Therapeutic appropriateness: Appropriate   I attest the patient was actively involved in and has agreed  to the above plan of care.  If the prescribed therapy is at any point deemed not appropriate based on the current or future assessments, a consultation will be initiated with the patient's specialty care provider to determine the best course of action. The revised plan of therapy will be documented along with any required assessments and/or additional patient education provided.       Linwood Morales, PharmD, MPH  Clinical Specialty Pharmacist, Endocrinology  3/18/2024  09:38 EDT

## 2024-03-18 NOTE — ASSESSMENT & PLAN NOTE
Diabetes is stable.   Continue current treatment regimen.  Diabetes will be reassessed in 6 months.    FreeStyle Jeramy 3 CGM was downloaded today.  Data was reviewed from 3/5/24 to 3/18/24.  This showed overall good glucose control with some increase in the AM consistent with ousmane phenomenon.  No patterns for medication adjustment at this time.

## 2024-03-18 NOTE — ASSESSMENT & PLAN NOTE
Has had semen analysis x 2 with low sperm count and decrease in normal morphology.  His wife us undergoing fertility analysis also.  IUI may be an option.

## 2024-03-20 ENCOUNTER — SPECIALTY PHARMACY (OUTPATIENT)
Dept: ENDOCRINOLOGY | Facility: CLINIC | Age: 44
End: 2024-03-20
Payer: COMMERCIAL

## 2024-03-20 RX ORDER — GLIMEPIRIDE 4 MG/1
8 TABLET ORAL
Qty: 180 TABLET | Refills: 3 | Status: SHIPPED | OUTPATIENT
Start: 2024-03-20

## 2024-03-20 RX ORDER — METFORMIN HYDROCHLORIDE 500 MG/1
1000 TABLET, EXTENDED RELEASE ORAL 2 TIMES DAILY
Qty: 360 TABLET | Refills: 3 | Status: SHIPPED | OUTPATIENT
Start: 2024-03-20

## 2024-03-20 RX ORDER — LISINOPRIL 40 MG/1
40 TABLET ORAL DAILY
Qty: 90 TABLET | Refills: 3 | Status: SHIPPED | OUTPATIENT
Start: 2024-03-20

## 2024-03-20 NOTE — PROGRESS NOTES
" Specialty Pharmacy Patient Management Program  Endocrinology Refill Outreach      George \"Maggie" is a 43 y.o. male contacted today regarding refills of his medication(s).    Specialty medication(s) and dose(s) confirmed: Farxiga, Ozempic  Other medication(s) being refilled: Glimepiride, Jeramy 3, Lisinopril, Metformin    Refill Questions      Flowsheet Row Most Recent Value   Changes to allergies? No   Changes to medications? No   New conditions or infections since last clinic visit No   Unplanned office visit, urgent care, ED, or hospital admission in the last 4 weeks  No   How does patient/caregiver feel medication is working? Very good   Financial problems or insurance changes  No   Since the previous refill, were any specialty medication doses or scheduled injections missed or delayed?  No   Does this patient require a clinical escalation to a pharmacist? No          Delivery Questions      Flowsheet Row Most Recent Value   Delivery method FedEx   Delivery address verified with patient/caregiver? Yes   Delivery address Home   Number of medications in delivery 6   Medication(s) being filled and delivered Glimepiride, Metformin Hcl (Biguanides), Lisinopril (Ace Inhibitors), Dapagliflozin Propanediol, Continuous Blood Gluc Sensor, Semaglutide   Copay verified? Yes   Copay amount Estimated Total: $150.45,  Phase Eight Offline on (3/20/2024) SpRx ERX down. Confirmed expected copay(s) based on most recent unaffected claim(s) on (1/26/2024):  $25 Ozempic (28d) with Voucher or Coupon, $75 Jeramy 3 (28d) with Voucher   Copay form of payment Credit/debit on file            Follow-Up: 28d      Linwood Morales, PharmD, MPH  Clinical Specialty Pharmacist, Endocrinology  3/20/2024  10:50 EDT    "

## 2024-03-20 NOTE — TELEPHONE ENCOUNTER
Specialty Pharmacy Patient Management Program  Prescription Refill Request     Patient currently fills medications at  Pharmacy. Needing refill(s) on the following:      Requested Prescriptions     Pending Prescriptions Disp Refills    glimepiride (Amaryl) 4 MG tablet 180 tablet 3     Sig: Take 2 tablets by mouth Every Morning Before Breakfast. **Replaces Glipizide**    lisinopril (PRINIVIL,ZESTRIL) 40 MG tablet 90 tablet 3     Sig: Take 1 tablet by mouth Daily. **Note strength of tablet changed to 40mg, only take 1 tablet daily**    metFORMIN ER (GLUCOPHAGE-XR) 500 MG 24 hr tablet 360 tablet 3     Sig: Take 2 tablets by mouth 2 (Two) Times a Day.     Pended for endocrinology provider, Dr. Schneider, to review and approve if appropriate.     Last Office Visit: 3/18/2024  Next Office Visit: 10/9/2024      Linwood Morales, PharmD, MPH  Clinical Specialty Pharmacist, Endocrinology  3/20/2024  10:31 EDT

## 2024-04-16 RX ORDER — DAPAGLIFLOZIN 10 MG/1
10 TABLET, FILM COATED ORAL DAILY
Qty: 90 TABLET | Refills: 3 | Status: SHIPPED | OUTPATIENT
Start: 2024-04-16

## 2024-04-16 NOTE — TELEPHONE ENCOUNTER
Specialty Pharmacy Patient Management Program  Prescription Refill Request     Patient currently fills medications at  Pharmacy. Needing refill(s) on the following:      Requested Prescriptions     Pending Prescriptions Disp Refills    dapagliflozin Propanediol (Farxiga) 10 MG tablet 90 tablet 3     Sig: Take 1 tablet by mouth once daily.     Pended for endocrinology provider, Dr. Schneider, to review and approve if appropriate.     Last Office Visit: 3/18/2024  Next Office Visit: 10/9/2024      Linwood Morales, PharmD, MPH  Clinical Specialty Pharmacist, Endocrinology  4/16/2024  14:47 EDT

## 2024-04-22 ENCOUNTER — SPECIALTY PHARMACY (OUTPATIENT)
Dept: ENDOCRINOLOGY | Facility: CLINIC | Age: 44
End: 2024-04-22
Payer: COMMERCIAL

## 2024-04-22 NOTE — PROGRESS NOTES
" Specialty Pharmacy Patient Management Program  Endocrinology Refill Outreach      George \"Maggie" is a 44 y.o. male contacted today regarding refills of his medication(s).    Specialty medication(s) and dose(s) confirmed: Ozempic, Farxiga    Refill Questions      Flowsheet Row Most Recent Value   Changes to allergies? No   Changes to medications? No   New conditions or infections since last clinic visit No   Unplanned office visit, urgent care, ED, or hospital admission in the last 4 weeks  No   How does patient/caregiver feel medication is working? Very good   Financial problems or insurance changes  No   Since the previous refill, were any specialty medication doses or scheduled injections missed or delayed?  No   Does this patient require a clinical escalation to a pharmacist? No          Delivery Questions      Flowsheet Row Most Recent Value   Delivery method FedEx   Delivery address verified with patient/caregiver? Yes   Delivery address Home   Number of medications in delivery 3   Medication(s) being filled and delivered Dapagliflozin Propanediol, Semaglutide, Continuous Blood Gluc Sensor   Doses left of specialty medications 3 days Farxiga, due for Ozempic this week   Copay verified? Yes   Copay amount $171.86   Copay form of payment Credit/debit on file            Follow-Up: 28d    Raysa Barnes CPhT  Pharmacy Care Coordinator, Endocrinology  4/22/2024  08:10 EDT          "

## 2024-05-16 ENCOUNTER — SPECIALTY PHARMACY (OUTPATIENT)
Dept: ENDOCRINOLOGY | Facility: CLINIC | Age: 44
End: 2024-05-16
Payer: COMMERCIAL

## 2024-05-16 NOTE — PROGRESS NOTES
" Specialty Pharmacy Patient Management Program  Endocrinology Refill Outreach      George \"Maggie" is a 44 y.o. male contacted today regarding refills of his medication(s).    Specialty medication(s) and dose(s) confirmed: Farxiga and Ozempic    Refill Questions      Flowsheet Row Most Recent Value   Changes to allergies? No   Changes to medications? No   New conditions or infections since last clinic visit No   Unplanned office visit, urgent care, ED, or hospital admission in the last 4 weeks  No   How does patient/caregiver feel medication is working? Very good   Financial problems or insurance changes  No   Since the previous refill, were any specialty medication doses or scheduled injections missed or delayed?  No   Does this patient require a clinical escalation to a pharmacist? No          Delivery Questions      Flowsheet Row Most Recent Value   Delivery method FedEx   Delivery address verified with patient/caregiver? Yes   Delivery address Home   Number of medications in delivery 3   Medication(s) being filled and delivered Semaglutide, Continuous Glucose Sensor, Dapagliflozin Propanediol   Doses left of specialty medications last shot taken this week, 1 week of farxiga   Copay verified? Yes   Copay amount $99.99   Copay form of payment Credit/debit on file            Follow-Up: 28d    Raysa Barnes CPhT  Pharmacy Care Coordinator, Endocrinology  5/20/2024  08:56 EDT          "

## 2024-06-07 ENCOUNTER — TELEPHONE (OUTPATIENT)
Dept: ENDOCRINOLOGY | Facility: CLINIC | Age: 44
End: 2024-06-07
Payer: COMMERCIAL

## 2024-06-07 RX ORDER — SEMAGLUTIDE 1.34 MG/ML
1 INJECTION, SOLUTION SUBCUTANEOUS WEEKLY
Qty: 9 ML | Refills: 3 | Status: SHIPPED | OUTPATIENT
Start: 2024-06-07

## 2024-06-07 NOTE — TELEPHONE ENCOUNTER
Specialty Pharmacy Patient Management Program  Per Protocol Prescription Order/Refill     Patient currently fills medications at [facility] and is enrolled in an Endocrinology Patient Management Program.     Requested Prescriptions     Pending Prescriptions Disp Refills    Semaglutide, 1 MG/DOSE, (Ozempic, 1 MG/DOSE,) 4 MG/3ML solution pen-injector 9 mL 3     Sig: Inject 1 mg under the skin into the appropriate area as directed 1 (One) Time Per Week.     Prescription orders above were sent to the pharmacy per Collaborative Care Agreement Protocol.     Celine Hussein PharmD, BCACP  Clinical Specialty Pharmacist, Endocrinology  6/7/2024  15:43 EDT

## 2024-06-11 ENCOUNTER — SPECIALTY PHARMACY (OUTPATIENT)
Dept: ENDOCRINOLOGY | Facility: CLINIC | Age: 44
End: 2024-06-11
Payer: COMMERCIAL

## 2024-06-11 NOTE — PROGRESS NOTES
" Specialty Pharmacy Patient Management Program  Endocrinology Refill Outreach      George \"Maggie" is a 44 y.o. male contacted today regarding refills of his medication(s).    Specialty medication(s) and dose(s) confirmed: Ozempic, Farxiga    Refill Questions      Flowsheet Row Most Recent Value   Changes to allergies? No   Changes to medications? No   New conditions or infections since last clinic visit No   Unplanned office visit, urgent care, ED, or hospital admission in the last 4 weeks  No   How does patient/caregiver feel medication is working? Very good   Financial problems or insurance changes  No   Since the previous refill, were any specialty medication doses or scheduled injections missed or delayed?  No   Does this patient require a clinical escalation to a pharmacist? No          Delivery Questions      Flowsheet Row Most Recent Value   Delivery method FedEx   Delivery address verified with patient/caregiver? Yes   Delivery address Home   Number of medications in delivery 6   Medication(s) being filled and delivered Semaglutide, Continuous Glucose Sensor, Rosuvastatin Calcium, Glimepiride, Lisinopril (Ace Inhibitors), Metformin Hcl (Biguanides)   Copay verified? Yes   Copay amount 170.83   Copay form of payment Credit/debit on file            Follow-Up: 28d    Darron Villatoro CPhT  Pharmacy Care Coordinator, Endocrinology  6/11/2024  10:59 EDT                    "

## 2024-07-09 ENCOUNTER — SPECIALTY PHARMACY (OUTPATIENT)
Dept: ENDOCRINOLOGY | Facility: CLINIC | Age: 44
End: 2024-07-09
Payer: COMMERCIAL

## 2024-07-09 NOTE — PROGRESS NOTES
" Specialty Pharmacy Patient Management Program  Endocrinology Refill Outreach      George \"Maggie" is a 44 y.o. male contacted today regarding refills of his medication(s).    Specialty medication(s) and dose(s) confirmed: Ozempic    Refill Questions      Flowsheet Row Most Recent Value   Changes to allergies? No   Changes to medications? No   New conditions or infections since last clinic visit No   Unplanned office visit, urgent care, ED, or hospital admission in the last 4 weeks  No   How does patient/caregiver feel medication is working? Very good   Financial problems or insurance changes  No   Since the previous refill, were any specialty medication doses or scheduled injections missed or delayed?  No   Does this patient require a clinical escalation to a pharmacist? No          Delivery Questions      Flowsheet Row Most Recent Value   Delivery method [employer]   Delivery address verified with patient/caregiver? Yes   Delivery address Home   Number of medications in delivery 2   Medication(s) being filled and delivered Continuous Glucose Sensor, Semaglutide   Doses left of specialty medications 8 days of Farxiga,  last dose of Ozempic taken this week   Copay verified? Yes   Copay amount $99.99   Copay form of payment Credit/debit on file   Ship Date 07/10   Delivery Date 07/11   Signature Required No            Follow-Up: 28d    Raysa Barnes CPhT  Pharmacy Care Coordinator, Endocrinology  7/9/2024  15:52 EDT          "

## 2024-07-15 ENCOUNTER — SPECIALTY PHARMACY (OUTPATIENT)
Dept: ENDOCRINOLOGY | Facility: CLINIC | Age: 44
End: 2024-07-15
Payer: COMMERCIAL

## 2024-07-15 NOTE — PROGRESS NOTES
" Specialty Pharmacy Patient Management Program  Endocrinology Refill Outreach      George \"Maggie" is a 44 y.o. male contacted today regarding refills of his medication(s).    Specialty medication(s) and dose(s) confirmed: Farxiga    Refill Questions      Flowsheet Row Most Recent Value   Changes to allergies? No   Changes to medications? No   New conditions or infections since last clinic visit No   Unplanned office visit, urgent care, ED, or hospital admission in the last 4 weeks  No   How does patient/caregiver feel medication is working? Very good   Financial problems or insurance changes  No   Since the previous refill, were any specialty medication doses or scheduled injections missed or delayed?  No   Does this patient require a clinical escalation to a pharmacist? No          Delivery Questions      Flowsheet Row Most Recent Value   Delivery method [employer]   Delivery address verified with patient/caregiver? Yes   Delivery address Home   Number of medications in delivery 1   Medication(s) being filled and delivered Dapagliflozin Propanediol   Doses left of specialty medications 3 weeks of Ozempic, 4 days of Farxiga   Copay verified? Yes   Copay amount $0   Copay form of payment No copayment ($0)   Ship Date 07/16   Delivery Date 07/17   Signature Required No            Follow-Up: 30d    Raysa Barnes CPhT  Pharmacy Care Coordinator, Endocrinology  7/15/2024  16:32 EDT          "

## 2024-08-06 ENCOUNTER — SPECIALTY PHARMACY (OUTPATIENT)
Dept: ENDOCRINOLOGY | Facility: CLINIC | Age: 44
End: 2024-08-06
Payer: COMMERCIAL

## 2024-08-06 NOTE — PROGRESS NOTES
" Specialty Pharmacy Patient Management Program  Endocrinology Refill Outreach      George \"Maggie" is a 44 y.o. male contacted today regarding refills of his medication(s).    Specialty medication(s) and dose(s) confirmed: ozempic    Refill Questions      Flowsheet Row Most Recent Value   Changes to allergies? No   Changes to medications? No   New conditions or infections since last clinic visit No   Unplanned office visit, urgent care, ED, or hospital admission in the last 4 weeks  No   How does patient/caregiver feel medication is working? Very good   Financial problems or insurance changes  No   Since the previous refill, were any specialty medication doses or scheduled injections missed or delayed?  No   Does this patient require a clinical escalation to a pharmacist? No          Delivery Questions      Flowsheet Row Most Recent Value   Delivery method [employer]   Delivery address verified with patient/caregiver? Yes   Delivery address Home   Number of medications in delivery 2   Medication(s) being filled and delivered Continuous Glucose Sensor, Semaglutide   Doses left of specialty medications 1 week of Farxiga   Copay verified? Yes   Copay amount $99.99   Copay form of payment Credit/debit on file   Ship Date 08/07   Delivery Date 08/08   Signature Required No            Follow-Up: 28d    Raysa Barnes CPhT  Pharmacy Care Coordinator, Endocrinology  8/6/2024  16:28 EDT          "

## 2024-08-13 ENCOUNTER — SPECIALTY PHARMACY (OUTPATIENT)
Dept: ENDOCRINOLOGY | Facility: CLINIC | Age: 44
End: 2024-08-13
Payer: COMMERCIAL

## 2024-08-13 NOTE — PROGRESS NOTES
" Specialty Pharmacy Patient Management Program  Endocrinology Refill Outreach      George \"Maggie" is a 44 y.o. male contacted today regarding refills of his medication(s).    Specialty medication(s) and dose(s) confirmed: Farxiga    Refill Questions      Flowsheet Row Most Recent Value   Changes to allergies? No   Changes to medications? No   New conditions or infections since last clinic visit No   Unplanned office visit, urgent care, ED, or hospital admission in the last 4 weeks  No   How does patient/caregiver feel medication is working? Very good   Financial problems or insurance changes  No   Since the previous refill, were any specialty medication doses or scheduled injections missed or delayed?  No   Does this patient require a clinical escalation to a pharmacist? No          Delivery Questions      Flowsheet Row Most Recent Value   Delivery method [employer]   Delivery address verified with patient/caregiver? Yes   Delivery address Home   Number of medications in delivery 1   Medication(s) being filled and delivered Dapagliflozin Propanediol   Doses left of specialty medications 3 weeks ozempic 3 days farxiga   Copay verified? Yes   Copay amount $0   Copay form of payment No copayment ($0)   Ship Date 08/13   Delivery Date 08/14   Signature Required No            Follow-Up: 30d    Raysa Barnes CPhT  Pharmacy Care Coordinator, Endocrinology  8/13/2024  10:55 EDT          "

## 2024-09-03 ENCOUNTER — SPECIALTY PHARMACY (OUTPATIENT)
Dept: ENDOCRINOLOGY | Facility: CLINIC | Age: 44
End: 2024-09-03
Payer: COMMERCIAL

## 2024-09-03 NOTE — PROGRESS NOTES
" Specialty Pharmacy Patient Management Program  Endocrinology Refill Outreach      George \"Maggie" is a 44 y.o. male contacted today regarding refills of his medication(s).    Specialty medication(s) and dose(s) confirmed: Ozempic    Refill Questions      Flowsheet Row Most Recent Value   Changes to allergies? No   Changes to medications? No   New conditions or infections since last clinic visit No   Unplanned office visit, urgent care, ED, or hospital admission in the last 4 weeks  No   How does patient/caregiver feel medication is working? Very good   Financial problems or insurance changes  No   Since the previous refill, were any specialty medication doses or scheduled injections missed or delayed?  No   Does this patient require a clinical escalation to a pharmacist? No          Delivery Questions      Flowsheet Row Most Recent Value   Delivery method UPS   Delivery address verified with patient/caregiver? Yes   Delivery address Home   Number of medications in delivery 3   Medication(s) being filled and delivered Continuous Glucose Sensor, Rosuvastatin Calcium (Hmg Coa Reductase Inhibitors), Semaglutide   Doses left of specialty medications 1 week of Farxiga last dose of   Copay verified? No   Copay amount 129.57   Copay form of payment Credit/debit on file   Ship Date 09/03   Delivery Date 09/04   Signature Required No            Follow-Up: 28d    Raysa Barnes CPhT  Pharmacy Care Coordinator, Endocrinology  9/3/2024  12:43 EDT          "

## 2024-09-04 ENCOUNTER — SPECIALTY PHARMACY (OUTPATIENT)
Dept: ENDOCRINOLOGY | Facility: CLINIC | Age: 44
End: 2024-09-04
Payer: COMMERCIAL

## 2024-09-04 NOTE — PROGRESS NOTES
Specialty Pharmacy Patient Management Program  Endocrinology Reassessment     George Tolentino was referred by an Endocrinology provider to the Endocrinology Patient Management program offered by [facility] for Type 2 Diabetes. A follow-up outreach was conducted, including assessment of continued therapy appropriateness, medication adherence, and side effect incidence and management for Farxiga and Ozempic.    Changes to Insurance Coverage or Financial Support  No changes    Relevant Past Medical History and Comorbidities  Relevant medical history and concomitant health conditions were discussed with the patient. The patient's chart has been reviewed for relevant past medical history and comorbid health conditions and updated as necessary.   Past Medical History:   Diagnosis Date    Anxiety     Depression     Diabetes mellitus     DM type 2 causing renal disease     GERD (gastroesophageal reflux disease)     Hypertension     Mixed hyperlipidemia     Mood disorder     Obesity     body mass index 30+-obesity    Type 2 diabetes mellitus, uncontrolled      Social History     Socioeconomic History    Marital status:    Tobacco Use    Smoking status: Former     Types: Cigars     Start date: 2010    Smokeless tobacco: Never    Tobacco comments:     1 per week   Vaping Use    Vaping status: Former    Substances: THC    Devices: Pre-filled or refillable cartridge   Substance and Sexual Activity    Alcohol use: Yes     Comment: 10 drinks/week    Drug use: Yes     Types: Marijuana    Sexual activity: Defer     Problem list reviewed by Slime Mendes, PharmD on 9/4/2024 at  8:32 AM    Hospitalizations and Urgent Care Since Last Assessment  ED Visits, Admissions, or Hospitalizations: None  Urgent Office Visits: None    Allergies  Known allergies and reactions were discussed with the patient. The patient's chart has been reviewed for allergy information and updated as necessary.   No Known  Allergies  Allergies reviewed by Slime Mendes, Shreya on 9/4/2024 at  8:32 AM    Relevant Laboratory Values  Relevant laboratory values were discussed with the patient. The following specialty medication dose adjustment(s) are recommended: None  A1C Last 3 Results          10/24/2023    08:05 3/18/2024    08:23   HGBA1C Last 3 Results   Hemoglobin A1C 6.6  6.5      Lab Results   Component Value Date    HGBA1C 6.5 (A) 03/18/2024     Lab Results   Component Value Date    GLUCOSE 112 (H) 03/18/2024    CALCIUM 9.2 03/18/2024     03/18/2024    K 3.7 03/18/2024    CO2 22.6 03/18/2024     03/18/2024    BUN 15 03/18/2024    CREATININE 0.69 (L) 03/18/2024    EGFRIFNONA 144 11/11/2021    BCR 21.7 03/18/2024    ANIONGAP 10.4 03/18/2024     Lab Results   Component Value Date    CHOL 90 03/18/2024    TRIG 100 03/18/2024    HDL 34 (L) 03/18/2024    LDL 37 03/18/2024     Microalbumin          10/24/2023    08:21 3/18/2024    08:31   Microalbumin   Microalbumin, Urine 3.4  3.8      Current Medication List  This medication list has been reviewed with the patient and evaluated for any interactions or necessary modifications/recommendations, and updated to include all prescription medications, OTC medications, and supplements the patient is currently taking.  This list reflects what is contained in the patient's profile, which has also been marked as reviewed to communicate to other providers it is the most up to date version of the patient's current medication therapy.     Current Outpatient Medications:     cholecalciferol (VITAMIN D3) 25 MCG (1000 UT) tablet, Take 1 tablet by mouth Daily., Disp: , Rfl:     Continuous Glucose Sensor (FreeStyle Jeramy 3 Sensor) [facility], Change Sensor Every 14 (Fourteen) Days, as directed., Disp: 6 each, Rfl: 3    dapagliflozin Propanediol (Farxiga) 10 MG tablet, Take 1 tablet by mouth once daily., Disp: 90 tablet, Rfl: 3    glimepiride (Amaryl) 4 MG tablet, Take 2 tablets by mouth Every  Morning Before Breakfast. **Replaces Glipizide**, Disp: 180 tablet, Rfl: 3    lisinopril (PRINIVIL,ZESTRIL) 40 MG tablet, Take 1 tablet by mouth Daily. **Note strength of tablet changed to 40mg, only take 1 tablet daily**, Disp: 90 tablet, Rfl: 3    metFORMIN ER (GLUCOPHAGE-XR) 500 MG 24 hr tablet, Take 2 tablets by mouth 2 (Two) Times a Day., Disp: 360 tablet, Rfl: 3    rosuvastatin (CRESTOR) 40 MG tablet, Take 1 tablet by mouth Daily., Disp: 90 tablet, Rfl: 3    Semaglutide, 1 MG/DOSE, (Ozempic, 1 MG/DOSE,) 4 MG/3ML solution pen-injector, Inject 1 mg under the skin into the appropriate area as directed 1 (One) Time Per Week., Disp: 9 mL, Rfl: 3    Medicines reviewed by Slime Mendes, PharmD on 9/4/2024 at  8:32 AM    Drug Interactions  No Clinically Significant DDIs Were Identified at Present Time Upon Marking Medications Reviewed      Recommended Medications Assessment  Aspirin: Not Taking Currently  Statin: Currently Taking   ACEi/ARB: Currently Taking     Adverse Drug Reactions  Medication tolerability: Tolerating with no to minimal ADRs  Medication plan: Continue therapy with normal follow-up  Plan for ADR Management: No ADR.     Adherence, Self-Administration, and Current Therapy Problems  Adherence related to the patient's specialty therapy was discussed with the patient. The Adherence segment of this outreach has been reviewed and updated.     Adherence Questions  Linked Medication(s) Assessed: Dapagliflozin Propanediol, Semaglutide  On average, how many doses/injections does the patient miss per month?: 0  What are the identified reasons for non-adherence or missed doses? : no problems identified  What is the estimated medication adherence level?: % (Ozempic 98%, Farxiga 100%)  Based on the patient/caregiver response and refill history, does this patient require an MTP to track adherence improvements?: no    Additional Barriers to Patient Self-Administration: None  Methods for Supporting Patient  Self-Administration: None    Open Medication Therapy Problems  No medication therapy recommendations to display    Goals of Therapy  Goals related to the patient's specialty therapy were discussed with the patient. The Patient Goals segment of this outreach has been reviewed and updated.   Goals Addressed Today        Specialty Pharmacy General Goal      A1C < 7 %     7/24/2023:  A1c has decreased to 6.6% from 7.5% during previous visit.  Goal completed.  Will now focus on maintaining A1c <7%.  CR    10/24/2023:  A1C steady at 6.6%, on track today with no changes to therapy at this time. KL     3/18/2024:  Pt maintaining A1c WNL (at goal), decreased slightly from 6.6% to 6.5% during today's visit.  No changes at this time, will continue to follow.  CR    9/4/2024: Phone reassessment, will follow up at next appointment 10/8. MS              Quality of Life Assessment   Quality of Life related to the patient's enrollment in the patient management program and services provided was discussed with the patient. The QOL segment of this outreach has been reviewed and updated.  Quality of Life Improvement Scale: 8-Moderately better    Reassessment Plan & Follow-Up  1. Medication Therapy Changes: No changes today due to phone reassessment. Will follow up at appointment on 10/9.   2. Related Plans, Therapy Recommendations, or Issues to Be Addressed: None  3. Pharmacist to perform regular assessments no more than (6) months from the previous assessment.  4. Care Coordinator to set up future refill outreaches, coordinate prescription delivery, and escalate clinical questions to pharmacist.    Attestation  Therapeutic appropriateness: Appropriate   I attest the patient was actively involved in and has agreed to the above plan of care.  If the prescribed therapy is at any point deemed not appropriate based on the current or future assessments, a consultation will be initiated with the patient's specialty care provider to determine  the best course of action. The revised plan of therapy will be documented along with any required assessments and/or additional patient education provided.     Slime Mendes, Shreya, BCCCP, BCPS  Clinical Specialty Pharmacist, Endocrinology  9/4/2024  08:33 EDT

## 2024-09-09 ENCOUNTER — SPECIALTY PHARMACY (OUTPATIENT)
Dept: ENDOCRINOLOGY | Facility: CLINIC | Age: 44
End: 2024-09-09
Payer: COMMERCIAL

## 2024-09-09 NOTE — PROGRESS NOTES
" Specialty Pharmacy Patient Management Program  Endocrinology Refill Outreach      George \"Maggie" is a 44 y.o. male contacted today regarding refills of his medication(s).    Specialty medication(s) and dose(s) confirmed: farxiga    Refill Questions      Flowsheet Row Most Recent Value   Changes to allergies? No   Changes to medications? No   New conditions or infections since last clinic visit No   Unplanned office visit, urgent care, ED, or hospital admission in the last 4 weeks  No   How does patient/caregiver feel medication is working? Very good   Financial problems or insurance changes  No   Since the previous refill, were any specialty medication doses or scheduled injections missed or delayed?  No   Does this patient require a clinical escalation to a pharmacist? No          Delivery Questions      Flowsheet Row Most Recent Value   Delivery method [employer]   Delivery address verified with patient/caregiver? Yes   Delivery address Home   Number of medications in delivery 4   Medication(s) being filled and delivered Lisinopril (Ace Inhibitors), Metformin Hcl (Biguanides), Dapagliflozin Propanediol, Glimepiride   Doses left of specialty medications 3 weeks   Copay verified? Yes   Copay amount 43.43   Copay form of payment Credit/debit on file   Ship Date 09/09   Delivery Date 09/10   Signature Required No            Follow-Up: 30d    Raysa Barnes CPhT  Pharmacy Care Coordinator, Endocrinology  9/9/2024  15:12 EDT          "

## 2024-09-30 ENCOUNTER — SPECIALTY PHARMACY (OUTPATIENT)
Age: 44
End: 2024-09-30
Payer: COMMERCIAL

## 2024-09-30 NOTE — PROGRESS NOTES
" Specialty Pharmacy Patient Management Program  Endocrinology Refill Outreach      George \"Maggie" is a 44 y.o. male contacted today regarding refills of his medication(s).    Specialty medication(s) and dose(s) confirmed: Ozempic.    Refill Questions      Flowsheet Row Most Recent Value   Changes to allergies? No   Changes to medications? No   New conditions or infections since last clinic visit No   Unplanned office visit, urgent care, ED, or hospital admission in the last 4 weeks  No   How does patient/caregiver feel medication is working? Very good   Financial problems or insurance changes  No   Since the previous refill, were any specialty medication doses or scheduled injections missed or delayed?  No   Does this patient require a clinical escalation to a pharmacist? No          Delivery Questions      Flowsheet Row Most Recent Value   Delivery method UPS   Delivery address verified with patient/caregiver? Yes   Delivery address Home   Number of medications in delivery 2   Medication(s) being filled and delivered Continuous Glucose Sensor, Semaglutide   Doses left of specialty medications 3 weeks   Copay verified? Yes   Copay amount $99.99   Copay form of payment Credit/debit on file   Ship Date 10/1   Delivery Date 10/2   Signature Required No            Follow-Up: 28natan Villatoro [facility]  Pharmacy Care Coordinator, Endocrinology  9/30/2024  13:19 EDT                    "

## 2024-10-07 ENCOUNTER — SPECIALTY PHARMACY (OUTPATIENT)
Dept: ENDOCRINOLOGY | Facility: CLINIC | Age: 44
End: 2024-10-07
Payer: COMMERCIAL

## 2024-10-07 NOTE — PROGRESS NOTES
" Specialty Pharmacy Patient Management Program  Endocrinology Refill Outreach      George \"Maggie" is a 44 y.o. male contacted today regarding refills of his medication(s).    Specialty medication(s) and dose(s) confirmed: Farxiga    Refill Questions      Flowsheet Row Most Recent Value   Changes to allergies? No   Changes to medications? No   New conditions or infections since last clinic visit No   Unplanned office visit, urgent care, ED, or hospital admission in the last 4 weeks  No   How does patient/caregiver feel medication is working? Very good   Financial problems or insurance changes  No   Since the previous refill, were any specialty medication doses or scheduled injections missed or delayed?  No   Does this patient require a clinical escalation to a pharmacist? No          Delivery Questions      Flowsheet Row Most Recent Value   Delivery method [employer]   Delivery address verified with patient/caregiver? Yes   Delivery address Home   Number of medications in delivery 1   Medication(s) being filled and delivered Dapagliflozin Propanediol   Doses left of specialty medications 21 days left   Copay verified? Yes   Copay amount 0   Copay form of payment No copayment ($0)   Ship Date 10/07   Delivery Date 10/08   Signature Required No            Follow-Up: 30d    Raysa Barnes CPhT  Pharmacy Care Coordinator, Endocrinology  10/7/2024  10:58 EDT          "

## 2024-10-09 ENCOUNTER — OFFICE VISIT (OUTPATIENT)
Dept: ENDOCRINOLOGY | Facility: CLINIC | Age: 44
End: 2024-10-09
Payer: COMMERCIAL

## 2024-10-09 ENCOUNTER — SPECIALTY PHARMACY (OUTPATIENT)
Dept: ENDOCRINOLOGY | Facility: CLINIC | Age: 44
End: 2024-10-09
Payer: COMMERCIAL

## 2024-10-09 VITALS
DIASTOLIC BLOOD PRESSURE: 70 MMHG | OXYGEN SATURATION: 98 % | HEIGHT: 74 IN | SYSTOLIC BLOOD PRESSURE: 116 MMHG | WEIGHT: 228 LBS | HEART RATE: 70 BPM | BODY MASS INDEX: 29.26 KG/M2

## 2024-10-09 DIAGNOSIS — R80.9 TYPE 2 DIABETES MELLITUS WITH MICROALBUMINURIA, WITHOUT LONG-TERM CURRENT USE OF INSULIN: ICD-10-CM

## 2024-10-09 DIAGNOSIS — E11.65 TYPE 2 DIABETES MELLITUS WITH HYPERGLYCEMIA, WITHOUT LONG-TERM CURRENT USE OF INSULIN: Primary | ICD-10-CM

## 2024-10-09 DIAGNOSIS — E78.2 MIXED HYPERLIPIDEMIA: ICD-10-CM

## 2024-10-09 DIAGNOSIS — E11.29 TYPE 2 DIABETES MELLITUS WITH MICROALBUMINURIA, WITHOUT LONG-TERM CURRENT USE OF INSULIN: ICD-10-CM

## 2024-10-09 LAB
ALBUMIN UR-MCNC: 9.2 MG/DL
CREAT UR-MCNC: 63.1 MG/DL
EXPIRATION DATE: ABNORMAL
EXPIRATION DATE: ABNORMAL
GLUCOSE BLDC GLUCOMTR-MCNC: 157 MG/DL (ref 70–130)
HBA1C MFR BLD: 7.7 % (ref 4.5–5.7)
Lab: ABNORMAL
Lab: ABNORMAL
MICROALBUMIN/CREAT UR: 145.8 MG/G (ref 0–29)

## 2024-10-09 PROCEDURE — 82043 UR ALBUMIN QUANTITATIVE: CPT | Performed by: INTERNAL MEDICINE

## 2024-10-09 PROCEDURE — 82570 ASSAY OF URINE CREATININE: CPT | Performed by: INTERNAL MEDICINE

## 2024-10-09 RX ORDER — SEMAGLUTIDE 2.68 MG/ML
2 INJECTION, SOLUTION SUBCUTANEOUS WEEKLY
Qty: 9 ML | Refills: 3 | Status: SHIPPED | OUTPATIENT
Start: 2024-10-09

## 2024-10-09 NOTE — PROGRESS NOTES
Specialty Pharmacy Patient Management Program  Endocrinology Reassessment     George Tolentino was referred by an Endocrinology provider to the Endocrinology Patient Management program offered by [facility] for Type 2 Diabetes. A follow-up outreach was conducted, including assessment of continued therapy appropriateness, medication adherence, and side effect incidence and management for Farxiga and Ozempic.    Changes to Insurance Coverage or Financial Support  None.     Relevant Past Medical History and Comorbidities  Relevant medical history and concomitant health conditions were discussed with the patient. The patient's chart has been reviewed for relevant past medical history and comorbid health conditions and updated as necessary.   Past Medical History:   Diagnosis Date    Anxiety     Depression     Diabetes mellitus     DM type 2 causing renal disease     GERD (gastroesophageal reflux disease)     Hypertension     Mixed hyperlipidemia     Mood disorder     Obesity     body mass index 30+-obesity    Type 2 diabetes mellitus, uncontrolled      Social History     Socioeconomic History    Marital status:    Tobacco Use    Smoking status: Former     Types: Cigars     Start date: 2010    Smokeless tobacco: Never    Tobacco comments:     1 per week   Vaping Use    Vaping status: Former    Substances: THC    Devices: Pre-filled or refillable cartridge   Substance and Sexual Activity    Alcohol use: Yes     Comment: 10 drinks/week    Drug use: Yes     Types: Marijuana    Sexual activity: Defer     Problem list reviewed by Slime Mendes, PharmD on 10/9/2024 at  8:24 AM    Hospitalizations and Urgent Care Since Last Assessment  ED Visits, Admissions, or Hospitalizations: None  Urgent Office Visits: None.    Allergies  Known allergies and reactions were discussed with the patient. The patient's chart has been reviewed for allergy information and updated as necessary.   No Known  Allergies  Allergies reviewed by Slime Mendes, Shreya on 10/9/2024 at  8:22 AM    Relevant Laboratory Values  Relevant laboratory values were discussed with the patient. The following specialty medication dose adjustment(s) are recommended:  A1c increased, planning to increase Ozempic to 2 mg weekly.   A1C Last 3 Results          10/24/2023    08:05 3/18/2024    08:23 10/9/2024    07:57   HGBA1C Last 3 Results   Hemoglobin A1C 6.6  6.5  7.7      Lab Results   Component Value Date    HGBA1C 7.7 (A) 10/09/2024     Lab Results   Component Value Date    GLUCOSE 112 (H) 03/18/2024    CALCIUM 9.2 03/18/2024     03/18/2024    K 3.7 03/18/2024    CO2 22.6 03/18/2024     03/18/2024    BUN 15 03/18/2024    CREATININE 0.69 (L) 03/18/2024    EGFRIFNONA 144 11/11/2021    BCR 21.7 03/18/2024    ANIONGAP 10.4 03/18/2024     Lab Results   Component Value Date    CHOL 90 03/18/2024    TRIG 100 03/18/2024    HDL 34 (L) 03/18/2024    LDL 37 03/18/2024     Microalbumin          10/24/2023    08:21 3/18/2024    08:31   Microalbumin   Microalbumin, Urine 3.4  3.8      Current Medication List  This medication list has been reviewed with the patient and evaluated for any interactions or necessary modifications/recommendations, and updated to include all prescription medications, OTC medications, and supplements the patient is currently taking.  This list reflects what is contained in the patient's profile, which has also been marked as reviewed to communicate to other providers it is the most up to date version of the patient's current medication therapy.     Current Outpatient Medications:     Continuous Glucose Sensor (FreeStyle Jeramy 3 Sensor) [facility], Change Sensor Every 14 (Fourteen) Days, as directed., Disp: 6 each, Rfl: 3    dapagliflozin Propanediol (Farxiga) 10 MG tablet, Take 1 tablet by mouth once daily., Disp: 90 tablet, Rfl: 3    glimepiride (Amaryl) 4 MG tablet, Take 2 tablets by mouth Every Morning Before  Breakfast. **Replaces Glipizide**, Disp: 180 tablet, Rfl: 3    lisinopril (PRINIVIL,ZESTRIL) 40 MG tablet, Take 1 tablet by mouth Daily. **Note strength of tablet changed to 40mg, only take 1 tablet daily**, Disp: 90 tablet, Rfl: 3    metFORMIN ER (GLUCOPHAGE-XR) 500 MG 24 hr tablet, Take 2 tablets by mouth 2 (Two) Times a Day., Disp: 360 tablet, Rfl: 3    rosuvastatin (CRESTOR) 40 MG tablet, Take 1 tablet by mouth Daily., Disp: 90 tablet, Rfl: 3    Semaglutide, 2 MG/DOSE, (Ozempic, 2 MG/DOSE,) 8 MG/3ML solution pen-injector, Inject 2 mg under the skin into the appropriate area as directed 1 (One) Time Per Week., Disp: 9 mL, Rfl: 3    Medicines reviewed by lSime Mendes, PharmD on 10/9/2024 at  8:22 AM    Drug Interactions  No Clinically Significant DDIs Were Identified at Present Time Upon Marking Medications Reviewed      Recommended Medications Assessment  Aspirin: Not Taking Currently  Statin: Currently Taking   ACEi/ARB: Currently Taking     Adverse Drug Reactions  Medication tolerability: Tolerating with no to minimal ADRs  Medication plan: Continue therapy with normal follow-up  Plan for ADR Management: None    Adherence, Self-Administration, and Current Therapy Problems  Adherence related to the patient's specialty therapy was discussed with the patient. The Adherence segment of this outreach has been reviewed and updated.     Adherence Questions  Linked Medication(s) Assessed: Dapagliflozin Propanediol, Semaglutide  On average, how many doses/injections does the patient miss per month?: 0  What are the identified reasons for non-adherence or missed doses? : no problems identified  What is the estimated medication adherence level?: % (Farxiga 100%, Ozempic 99%)  Based on the patient/caregiver response and refill history, does this patient require an MTP to track adherence improvements?: no    Additional Barriers to Patient Self-Administration: None  Methods for Supporting Patient Self-Administration:  None    Open Medication Therapy Problems  No medication therapy recommendations to display    Goals of Therapy  Goals related to the patient's specialty therapy were discussed with the patient. The Patient Goals segment of this outreach has been reviewed and updated.   Goals Addressed Today        Specialty Pharmacy General Goal      A1C < 7 %     7/24/2023:  A1c has decreased to 6.6% from 7.5% during previous visit.  Goal completed.  Will now focus on maintaining A1c <7%.  CR    10/24/2023:  A1C steady at 6.6%, on track today with no changes to therapy at this time. KL     3/18/2024:  Pt maintaining A1c WNL (at goal), decreased slightly from 6.6% to 6.5% during today's visit.  No changes at this time, will continue to follow.  CR    9/4/2024: Phone reassessment, will follow up at next appointment 10/8. MS    10/9/2024: A1c up to 7.7% . Plan to increase Ozempic to 2 mg today. Patient has previously trialed Ozempic at this dose and had terrible nausea. Plan to follow up next week after his first 2 mg dose and determine if tolerable. If unable to tolerate, consider transition to Mounjaro or Trulicity. MS              Quality of Life Assessment   Quality of Life related to the patient's enrollment in the patient management program and services provided was discussed with the patient. The QOL segment of this outreach has been reviewed and updated.  Quality of Life Improvement Scale: 9-A good deal better    Reassessment Plan & Follow-Up  1. Medication Therapy Changes: Planning to increase to Ozempic 2 mg weekly dosing. Plan to follow up with patient to assess tolerability.   2. Related Plans, Therapy Recommendations, or Issues to Be Addressed: none.  3. Pharmacist to perform regular assessments no more than (6) months from the previous assessment.  4. Care Coordinator to set up future refill outreaches, coordinate prescription delivery, and escalate clinical questions to pharmacist.    Attestation  Therapeutic  appropriateness: Appropriate   I attest the patient was actively involved in and has agreed to the above plan of care.  If the prescribed therapy is at any point deemed not appropriate based on the current or future assessments, a consultation will be initiated with the patient's specialty care provider to determine the best course of action. The revised plan of therapy will be documented along with any required assessments and/or additional patient education provided.     Slime Mendes, Shreya, BCCCP, BCPS  Clinical Specialty Pharmacist, Endocrinology  10/9/2024  08:25 EDT    Discussed the aforementioned information with the patient via In-Person.

## 2024-10-09 NOTE — PROGRESS NOTES
"     Office Note      Date: 10/09/2024  Patient Name: George Tolentino  MRN: [id number]  : 1980    Chief Complaint   Patient presents with    Diabetes     Type II       History of Present Illness:   George Tolentino is a 44 y.o. male who presents for Diabetes type 2. Diagnosed in: . Treated in past with oral agents, insulin and trulicity. Current treatments: farxiga, glimepiride, metformin and ozempic.  Number of insulin shots per day: none. Checks blood sugar 288 times a day - on Jeramy. Has low blood sugar: rare. Aspirin use: No. Statin use: Yes. ACE-I/ARB use: Yes. Changes in health since last visit: none. Last eye exam: 2024.       Subjective      Diabetic Complications:  Eyes: No  Kidneys: Yes - microalbuminuria  Feet: No  Heart: No    Diet and Exercise:  Meals per day: 3  Minutes of exercise per week: 0 mins.    Review of Systems:   Review of Systems   Constitutional: Negative.    Cardiovascular: Negative.    Gastrointestinal: Negative.    Endocrine: Negative.        The following portions of the patient's history were reviewed and updated as appropriate: allergies, current medications, past family history, past medical history, past social history, past surgical history, and problem list.    Objective     Visit Vitals  /70 (BP Location: Left arm, Patient Position: Sitting, Cuff Size: Adult)   Pulse 70   Ht 188 cm (74\")   Wt 103 kg (228 lb)   SpO2 98%   BMI 29.27 kg/m²       Physical Exam:  Physical Exam  Constitutional:       Appearance: Normal appearance.   Cardiovascular:      Pulses:           Dorsalis pedis pulses are 2+ on the right side and 2+ on the left side.        Posterior tibial pulses are 2+ on the right side and 2+ on the left side.   Musculoskeletal:      Right foot: Deformity present.      Left foot: Deformity present.   Feet:      Right foot:      Protective Sensation: 5 sites tested.  5 sites sensed.      Skin integrity: Skin integrity normal.      Toenail Condition: Right " toenails are normal.      Left foot:      Protective Sensation: 5 sites tested.  5 sites sensed.      Skin integrity: Skin integrity normal.      Toenail Condition: Left toenails are normal.      Comments: Flat feet  Neurological:      Mental Status: He is alert.         Labs:    HbA1c  Lab Results   Component Value Date    HGBA1C 7.7 (A) 10/09/2024       CMP  Lab Results   Component Value Date    GLUCOSE 112 (H) 03/18/2024    BUN 15 03/18/2024    CREATININE 0.69 (L) 03/18/2024    EGFRIFNONA 144 11/11/2021    BCR 21.7 03/18/2024    K 3.7 03/18/2024    CO2 22.6 03/18/2024    CALCIUM 9.2 03/18/2024    AST 32 03/18/2024    ALT 45 (H) 03/18/2024        Lipid Panel  Lab Results   Component Value Date    HDL 34 (L) 03/18/2024    LDL 37 03/18/2024    TRIG 100 03/18/2024        TSH  Lab Results   Component Value Date    TSH 0.905 03/18/2024        Hemoglobin A1C  Lab Results   Component Value Date    HGBA1C 7.7 (A) 10/09/2024        Microalbumin/Creatinine  Lab Results   Component Value Date    MALBCRERATIO 75.2 (H) 03/18/2024    MICROALBUR 3.8 03/18/2024           Assessment / Plan      Assessment & Plan:  Diagnoses and all orders for this visit:    1. Type 2 diabetes mellitus with hyperglycemia, without long-term current use of insulin (Primary)  Assessment & Plan:  Diabetes is worsening.   We discussed treatment options.  Titrate up ozempic.  Diabetes will be reassessed in 6 months.    FreeStyle Jeramy 3 CGM was downloaded today. Data was reviewed from 9/26/24 to 10/9/24.  This showed glucose level were variable with some postprandial spikes.  Time in range was 51%.  Will titrate up ozempic to address this.  Work on diet/exercise.    He has been paying for Jeramy out of pocket.  He is considering going back to [employer].    Orders:  -     POC Glucose, Blood  -     POC Glycosylated Hemoglobin (Hb A1C)    2. Mixed hyperlipidemia  Assessment & Plan:  Continue statin.  Lipids were okay last visit.      3. Type 2 diabetes mellitus  with microalbuminuria, without long-term current use of insulin  Assessment & Plan:  Continue ACE-I and SGLT-2 inhibitor.  Check microalbumin again today.    Orders:  -     Microalbumin / Creatinine Urine Ratio - Urine, Clean Catch; Future    Other orders  -     Semaglutide, 2 MG/DOSE, (Ozempic, 2 MG/DOSE,) 8 MG/3ML solution pen-injector; Inject 2 mg under the skin into the appropriate area as directed 1 (One) Time Per Week.  Dispense: 9 mL; Refill: 3      Current Outpatient Medications   Medication Instructions    Continuous Glucose Sensor (FreeStyle Jeraym 3 Sensor) misc Change Sensor Every 14 (Fourteen) Days, as directed.    dapagliflozin Propanediol (Farxiga) 10 MG tablet Take 1 tablet by mouth once daily.    glimepiride (AMARYL) 8 mg, Oral, Every Morning Before Breakfast, **Replaces Glipizide**    lisinopril (PRINIVIL,ZESTRIL) 40 mg, Oral, Daily, **Note strength of tablet changed to 40mg, only take 1 tablet daily**    metFORMIN ER (GLUCOPHAGE-XR) 1,000 mg, Oral, 2 Times Daily    Ozempic (2 MG/DOSE) 2 mg, Subcutaneous, Weekly    rosuvastatin (CRESTOR) 40 mg, Oral, Daily      Return in about 6 months (around 4/9/2025) for Recheck with A1c, CMP, lipid, TSH, microalbumin, foot exam.    Electronically signed by: Franco Schneider MD  10/09/2024

## 2024-10-09 NOTE — ASSESSMENT & PLAN NOTE
Diabetes is worsening.   We discussed treatment options.  Titrate up ozempic.  Diabetes will be reassessed in 6 months.    FreeStyle Jeramy 3 CGM was downloaded today. Data was reviewed from 9/26/24 to 10/9/24.  This showed glucose level were variable with some postprandial spikes.  Time in range was 51%.  Will titrate up ozempic to address this.  Work on diet/exercise.    He has been paying for Jeramy out of pocket.  He is considering going back to [facility].

## 2024-10-17 ENCOUNTER — SPECIALTY PHARMACY (OUTPATIENT)
Dept: ENDOCRINOLOGY | Facility: CLINIC | Age: 44
End: 2024-10-17
Payer: COMMERCIAL

## 2024-10-17 NOTE — PROGRESS NOTES
" Specialty Pharmacy Patient Management Program  Endocrinology Refill Outreach      George \"Maggie" is a 44 y.o. male contacted today regarding refills of his medication(s).    Specialty medication(s) and dose(s) confirmed: ozempic    Refill Questions      Flowsheet Row Most Recent Value   Changes to allergies? No   Changes to medications? No   New conditions or infections since last clinic visit No   Unplanned office visit, urgent care, ED, or hospital admission in the last 4 weeks  No   How does patient/caregiver feel medication is working? Very good   Financial problems or insurance changes  No   Since the previous refill, were any specialty medication doses or scheduled injections missed or delayed?  No   Does this patient require a clinical escalation to a pharmacist? No          Delivery Questions      Flowsheet Row Most Recent Value   Delivery method UPS   Delivery address verified with patient/caregiver? Yes   Delivery address Home   Number of medications in delivery 1   Medication(s) being filled and delivered Semaglutide (Ozempic (2 MG/DOSE))   Doses left of specialty medications due for next dose Monday   Copay verified? Yes   Copay amount 25   Copay form of payment Credit/debit on file   Ship Date 10/17   Delivery Date 10/18   Signature Required No            Follow-Up: 28d    Raysa Barnes CPhT  Pharmacy Care Coordinator, Endocrinology  10/17/2024  14:04 EDT          "

## 2024-11-04 ENCOUNTER — SPECIALTY PHARMACY (OUTPATIENT)
Age: 44
End: 2024-11-04
Payer: COMMERCIAL

## 2024-11-04 NOTE — PROGRESS NOTES
" Specialty Pharmacy Patient Management Program  Endocrinology Refill Outreach      George \"Maggie" is a 44 y.o. male contacted today regarding refills of his medication(s).    Specialty medication(s) and dose(s) confirmed: Farxiga.    Refill Questions      Flowsheet Row Most Recent Value   Changes to allergies? No   Changes to medications? No   New conditions or infections since last clinic visit No   Unplanned office visit, urgent care, ED, or hospital admission in the last 4 weeks  No   How does patient/caregiver feel medication is working? Very good   Financial problems or insurance changes  No   Since the previous refill, were any specialty medication doses or scheduled injections missed or delayed?  No   Does this patient require a clinical escalation to a pharmacist? No          Delivery Questions      Flowsheet Row Most Recent Value   Delivery method UPS   Delivery address verified with patient/caregiver? Yes   Delivery address Home   Number of medications in delivery 1   Medication(s) being filled and delivered Dapagliflozin Propanediol   Doses left of specialty medications 1   Copay verified? Yes   Copay amount $0   Copay form of payment No copayment ($0)   Ship Date 11/04   Delivery Date 11/05   Signature Required No            Follow-Up: 30d    Darron Villatoro CPhT  Pharmacy Care Coordinator, Endocrinology  11/4/2024  12:48 EST                    "

## 2024-11-14 ENCOUNTER — SPECIALTY PHARMACY (OUTPATIENT)
Dept: ENDOCRINOLOGY | Facility: CLINIC | Age: 44
End: 2024-11-14
Payer: COMMERCIAL

## 2024-11-14 NOTE — PROGRESS NOTES
" Specialty Pharmacy Patient Management Program  Endocrinology Refill Outreach      George \"Maggie" is a 44 y.o. male contacted today regarding refills of his medication(s).    Specialty medication(s) and dose(s) confirmed: ozempic    Refill Questions      Flowsheet Row Most Recent Value   Changes to allergies? No   Changes to medications? No   New conditions or infections since last clinic visit No   Unplanned office visit, urgent care, ED, or hospital admission in the last 4 weeks  No   How does patient/caregiver feel medication is working? Very good   Financial problems or insurance changes  No   Since the previous refill, were any specialty medication doses or scheduled injections missed or delayed?  No   Does this patient require a clinical escalation to a pharmacist? No          Delivery Questions      Flowsheet Row Most Recent Value   Delivery method UPS   Delivery address verified with patient/caregiver? Yes   Delivery address Home   Number of medications in delivery 1   Medication(s) being filled and delivered Semaglutide (Ozempic (2 MG/DOSE))   Doses left of specialty medications due this week for dose   Copay verified? Yes   Copay amount 25   Copay form of payment Credit/debit on file   Ship Date 11/14   Delivery Date 11/15   Signature Required No            Follow-Up: 28d    Raysa Barnes CPhT  Pharmacy Care Coordinator, Endocrinology  11/14/2024  10:54 EST            "

## 2024-12-02 ENCOUNTER — SPECIALTY PHARMACY (OUTPATIENT)
Dept: ENDOCRINOLOGY | Facility: CLINIC | Age: 44
End: 2024-12-02
Payer: COMMERCIAL

## 2024-12-02 NOTE — PROGRESS NOTES
" Specialty Pharmacy Patient Management Program  Endocrinology Refill Outreach      George \"Maggie" is a 44 y.o. male contacted today regarding refills of his medication(s).    Specialty medication(s) and dose(s) confirmed: farxiga    Refill Questions      Flowsheet Row Most Recent Value   Changes to allergies? No   Changes to medications? No   New conditions or infections since last clinic visit No   Unplanned office visit, urgent care, ED, or hospital admission in the last 4 weeks  No   How does patient/caregiver feel medication is working? Very good   Financial problems or insurance changes  No   Since the previous refill, were any specialty medication doses or scheduled injections missed or delayed?  No   Does this patient require a clinical escalation to a pharmacist? No          Delivery Questions      Flowsheet Row Most Recent Value   Delivery method UPS   Delivery address verified with patient/caregiver? Yes   Delivery address Home   Number of medications in delivery 1   Medication(s) being filled and delivered Dapagliflozin Propanediol   Doses left of specialty medications 1-2 weeks of ozempic   Copay verified? No   Copay amount 0   Ship Date 12/03   Delivery Date 12/04   Signature Required No            Follow-Up: 30d    Raysa Barnes CPhT  Pharmacy Care Coordinator, Endocrinology  12/2/2024  16:10 EST          "

## 2024-12-05 ENCOUNTER — SPECIALTY PHARMACY (OUTPATIENT)
Age: 44
End: 2024-12-05
Payer: COMMERCIAL

## 2024-12-26 ENCOUNTER — SPECIALTY PHARMACY (OUTPATIENT)
Dept: ENDOCRINOLOGY | Facility: CLINIC | Age: 44
End: 2024-12-26
Payer: COMMERCIAL

## 2024-12-26 NOTE — PROGRESS NOTES
" Specialty Pharmacy Patient Management Program  Endocrinology Refill Outreach      George \"Maggie" is a 44 y.o. male contacted today regarding refills of his medication(s).    Specialty medication(s) and dose(s) confirmed: ozempic    Refill Questions      Flowsheet Row Most Recent Value   Changes to allergies? No   Changes to medications? No   New conditions or infections since last clinic visit No   Unplanned office visit, urgent care, ED, or hospital admission in the last 4 weeks  No   How does patient/caregiver feel medication is working? Very good   Financial problems or insurance changes  No   Since the previous refill, were any specialty medication doses or scheduled injections missed or delayed?  No   Does this patient require a clinical escalation to a pharmacist? No          Delivery Questions      Flowsheet Row Most Recent Value   Delivery method UPS   Delivery address verified with patient/caregiver? Yes   Delivery address Home   Number of medications in delivery 1   Medication(s) being filled and delivered Semaglutide (Ozempic (2 MG/DOSE))   Doses left of specialty medications due   Copay verified? Yes   Copay amount 25   Copay form of payment Credit/debit on file   Ship Date 12/26   Delivery Date 12/27   Signature Required No            Follow-Up: 28d    Raysa Barnes Mikey  Pharmacy Care Coordinator, Endocrinology  12/26/2024  08:57 EST          "

## 2025-01-22 ENCOUNTER — SPECIALTY PHARMACY (OUTPATIENT)
Dept: ENDOCRINOLOGY | Facility: CLINIC | Age: 45
End: 2025-01-22
Payer: COMMERCIAL

## 2025-01-22 NOTE — PROGRESS NOTES
" Specialty Pharmacy Patient Management Program  Endocrinology Refill Outreach      George \"Maggie" is a 44 y.o. male contacted today regarding refills of his medication(s).    Specialty medication(s) and dose(s) confirmed: Mounjaro, Farxiga    Refill Questions      Flowsheet Row Most Recent Value   Changes to allergies? No   Changes to medications? No   New conditions or infections since last clinic visit No   Unplanned office visit, urgent care, ED, or hospital admission in the last 4 weeks  No   How does patient/caregiver feel medication is working? Very good   Financial problems or insurance changes  No   Since the previous refill, were any specialty medication doses or scheduled injections missed or delayed?  No   Does this patient require a clinical escalation to a pharmacist? No          Delivery Questions      Flowsheet Row Most Recent Value   Delivery method UPS   Delivery address verified with patient/caregiver? Yes   Delivery address Home   Number of medications in delivery 2   Medication(s) being filled and delivered Dapagliflozin Propanediol, Semaglutide (Ozempic (2 MG/DOSE))   Copay verified? Yes   Copay amount 25   Copay form of payment Credit/debit on file   Ship Date 1/23   Delivery Date Selection 01/24/25   Signature Required No            Follow-Up: 30d    Slime Mendes, Pharm.D. BCPS, BCCCP  Clinical Specialty Pharmacist, Endocrinology   13:56 EST  01/22/25    "

## 2025-02-20 ENCOUNTER — SPECIALTY PHARMACY (OUTPATIENT)
Dept: ENDOCRINOLOGY | Facility: CLINIC | Age: 45
End: 2025-02-20
Payer: COMMERCIAL

## 2025-02-20 NOTE — PROGRESS NOTES
" Specialty Pharmacy Patient Management Program  Endocrinology Refill Outreach      George \"Maggie" is a 44 y.o. male contacted today regarding refills of his medication(s).    Specialty medication(s) and dose(s) confirmed: Farxiga and ozempic    Refill Questions      Flowsheet Row Most Recent Value   Changes to allergies? No   Changes to medications? No   New conditions or infections since last clinic visit No   Unplanned office visit, urgent care, ED, or hospital admission in the last 4 weeks  No   How does patient/caregiver feel medication is working? Very good   Financial problems or insurance changes  No   Since the previous refill, were any specialty medication doses or scheduled injections missed or delayed?  No   Does this patient require a clinical escalation to a pharmacist? No          Delivery Questions      Flowsheet Row Most Recent Value   Delivery method UPS   Delivery address verified with patient/caregiver? Yes   Delivery address Home   Number of medications in delivery 2   Medication(s) being filled and delivered Semaglutide (Ozempic (2 MG/DOSE)), Dapagliflozin Propanediol   Doses left of specialty medications 1 week   Copay verified? Yes   Copay amount 25   Copay form of payment Credit/debit on file   Delivery Date Selection 02/21/25   Signature Required No            Follow-Up: 28d    Raysa Barnes CPhT  Pharmacy Care Coordinator, Endocrinology  2/20/2025  11:01 EST          "

## 2025-03-19 RX ORDER — METFORMIN HYDROCHLORIDE 500 MG/1
1000 TABLET, EXTENDED RELEASE ORAL 2 TIMES DAILY
Qty: 360 TABLET | Refills: 3 | Status: SHIPPED | OUTPATIENT
Start: 2025-03-19

## 2025-03-19 RX ORDER — LISINOPRIL 40 MG/1
40 TABLET ORAL DAILY
Qty: 90 TABLET | Refills: 3 | Status: SHIPPED | OUTPATIENT
Start: 2025-03-19

## 2025-03-19 RX ORDER — GLIMEPIRIDE 4 MG/1
8 TABLET ORAL
Qty: 180 TABLET | Refills: 3 | Status: SHIPPED | OUTPATIENT
Start: 2025-03-19

## 2025-03-19 RX ORDER — ROSUVASTATIN CALCIUM 40 MG/1
40 TABLET, COATED ORAL DAILY
Qty: 90 TABLET | Refills: 3 | Status: SHIPPED | OUTPATIENT
Start: 2025-03-19

## 2025-03-19 NOTE — TELEPHONE ENCOUNTER
Rx Refill Note  Requested Prescriptions     Pending Prescriptions Disp Refills    glimepiride (Amaryl) 4 MG tablet 180 tablet 3     Sig: Take 2 tablets by mouth Every Morning Before Breakfast. **Replaces Glipizide**    lisinopril (PRINIVIL,ZESTRIL) 40 MG tablet 90 tablet 3     Sig: Take 1 tablet by mouth Daily. **Note strength of tablet changed to 40mg, only take 1 tablet daily**    metFORMIN ER (GLUCOPHAGE-XR) 500 MG 24 hr tablet 360 tablet 3     Sig: Take 2 tablets by mouth 2 (Two) Times a Day.      Last office visit with prescribing clinician: 10/9/2024   Last telemedicine visit with prescribing clinician: Visit date not found   Next office visit with prescribing clinician: 8/21/2025                         Would you like a call back once the refill request has been completed: [] Yes [] No    If the office needs to give you a call back, can they leave a voicemail: [] Yes [] No    Kena Valdez MA  03/19/25, 13:44 EDT

## 2025-03-19 NOTE — TELEPHONE ENCOUNTER
Specialty Pharmacy Patient Management Program  Prescription Refill Request     Patient currently fills medications at  Pharmacy. Needing refill(s) on the following:      Requested Prescriptions     Pending Prescriptions Disp Refills    rosuvastatin (CRESTOR) 40 MG tablet 90 tablet 3     Sig: Take 1 tablet by mouth Daily.     Pended for endocrinology provider, Dr. Schneider, to review and approve if appropriate.     Patient needing refill.    Kang Ho.GINO.  Clinical Specialty Pharmacist, Endocrinology  09:37 EDT 03/19/25

## 2025-03-20 ENCOUNTER — SPECIALTY PHARMACY (OUTPATIENT)
Dept: ENDOCRINOLOGY | Facility: CLINIC | Age: 45
End: 2025-03-20
Payer: COMMERCIAL

## 2025-03-20 NOTE — PROGRESS NOTES
" Specialty Pharmacy Patient Management Program  Endocrinology Refill Outreach      George \"Maggie" is a 44 y.o. male contacted today regarding refills of his medication(s).    Specialty medication(s) and dose(s) confirmed: farxiga and ozempic    Refill Questions      Flowsheet Row Most Recent Value   Changes to allergies? No   Changes to medications? No   New conditions or infections since last clinic visit No   Unplanned office visit, urgent care, ED, or hospital admission in the last 4 weeks  No   How does patient/caregiver feel medication is working? Very good   Financial problems or insurance changes  No   Since the previous refill, were any specialty medication doses or scheduled injections missed or delayed?  No   Does this patient require a clinical escalation to a pharmacist? No          Delivery Questions      Flowsheet Row Most Recent Value   Delivery method UPS   Delivery address verified with patient/caregiver? Yes   Delivery address Home   Number of medications in delivery 6   Medication(s) being filled and delivered Dapagliflozin Propanediol, Glimepiride (AMARYL), Lisinopril (PRINIVIL,ZESTRIL), Semaglutide (Ozempic (2 MG/DOSE)), Rosuvastatin Calcium (CRESTOR), metFORMIN HCl (GLUCOPHAGE-XR)   Doses left of specialty medications 1 week   Copay verified? Yes   Copay amount 58.33   Copay form of payment Credit/debit on file   Delivery Date Selection 03/21/25   Signature Required No            Follow-Up: 28d    Raysa Barnes CPhT  Pharmacy Care Coordinator, Endocrinology  3/20/2025  11:48 EDT          "

## 2025-03-31 ENCOUNTER — SPECIALTY PHARMACY (OUTPATIENT)
Dept: GENERAL RADIOLOGY | Facility: HOSPITAL | Age: 45
End: 2025-03-31
Payer: COMMERCIAL

## 2025-03-31 NOTE — PROGRESS NOTES
Specialty Pharmacy Patient Management Program  Endocrinology Reassessment     George Tolentino was referred by an Endocrinology provider to the Endocrinology Patient Management program offered by [facility] for Type 2 Diabetes. A follow-up outreach was conducted, including assessment of continued therapy appropriateness, medication adherence, and side effect incidence and management for Farxiga and Ozempic.    Changes to Insurance Coverage or Financial Support  No changes to insurance.     Relevant Past Medical History and Comorbidities  Relevant medical history and concomitant health conditions were discussed with the patient. The patient's chart has been reviewed for relevant past medical history and comorbid health conditions and updated as necessary.   Past Medical History:   Diagnosis Date    Anxiety     Depression     Diabetes mellitus     DM type 2 causing renal disease     GERD (gastroesophageal reflux disease)     Hypertension     Mixed hyperlipidemia     Mood disorder     Obesity     body mass index 30+-obesity    Type 2 diabetes mellitus, uncontrolled      Social History     Socioeconomic History    Marital status:    Tobacco Use    Smoking status: Former     Types: Cigars     Start date: 2010    Smokeless tobacco: Never    Tobacco comments:     1 per week   Vaping Use    Vaping status: Former    Substances: THC    Devices: Pre-filled or refillable cartridge   Substance and Sexual Activity    Alcohol use: Yes     Comment: 10 drinks/week    Drug use: Yes     Types: Marijuana    Sexual activity: Defer     Problem list reviewed by Knag Byrd RPH on 3/31/2025 at 11:22 AM    Hospitalizations and Urgent Care Since Last Assessment  ED Visits, Admissions, or Hospitalizations: None  Urgent Office Visits: None    Allergies  Known allergies and reactions were discussed with the patient. The patient's chart has been reviewed for allergy information and updated as necessary.   No  Known Allergies  Allergies reviewed by Kagn Byrd RP on 3/31/2025 at 11:22 AM    Relevant Laboratory Values  Relevant laboratory values were discussed with the patient. The following specialty medication dose adjustment(s) are recommended: None  A1C Last 3 Results          10/9/2024    07:57   HGBA1C Last 3 Results   Hemoglobin A1C 7.7      Lab Results   Component Value Date    HGBA1C 7.7 (A) 10/09/2024     Lab Results   Component Value Date    GLUCOSE 112 (H) 03/18/2024    CALCIUM 9.2 03/18/2024     03/18/2024    K 3.7 03/18/2024    CO2 22.6 03/18/2024     03/18/2024    BUN 15 03/18/2024    CREATININE 0.69 (L) 03/18/2024    EGFRIFNONA 144 11/11/2021    BCR 21.7 03/18/2024    ANIONGAP 10.4 03/18/2024     Lab Results   Component Value Date    CHOL 90 03/18/2024    TRIG 100 03/18/2024    HDL 34 (L) 03/18/2024    LDL 37 03/18/2024     Microalbumin          10/9/2024    08:27   Microalbumin   Microalbumin, Urine 9.2      Current Medication List  This medication list has been reviewed with the patient and evaluated for any interactions or necessary modifications/recommendations, and updated to include all prescription medications, OTC medications, and supplements the patient is currently taking.  This list reflects what is contained in the patient's profile, which has also been marked as reviewed to communicate to other providers it is the most up to date version of the patient's current medication therapy.     Current Outpatient Medications:     Continuous Glucose Sensor (FreeStyle Jeramy 3 Sensor) [facility], Change Sensor Every 14 (Fourteen) Days, as directed., Disp: 6 each, Rfl: 3    dapagliflozin Propanediol (Farxiga) 10 MG tablet, Take 1 tablet by mouth once daily., Disp: 90 tablet, Rfl: 3    glimepiride (Amaryl) 4 MG tablet, Take 2 tablets by mouth Every Morning Before Breakfast. **Replaces Glipizide**, Disp: 180 tablet, Rfl: 3    lisinopril (PRINIVIL,ZESTRIL) 40 MG tablet, Take 1 tablet by mouth Daily.  **Note strength of tablet changed to 40mg, only take 1 tablet daily**, Disp: 90 tablet, Rfl: 3    metFORMIN ER (GLUCOPHAGE-XR) 500 MG 24 hr tablet, Take 2 tablets by mouth 2 (Two) Times a Day., Disp: 360 tablet, Rfl: 3    rosuvastatin (CRESTOR) 40 MG tablet, Take 1 tablet by mouth Daily., Disp: 90 tablet, Rfl: 3    Semaglutide, 2 MG/DOSE, (Ozempic, 2 MG/DOSE,) 8 MG/3ML solution pen-injector, Inject 2 mg under the skin into the appropriate area as directed 1 (One) Time Per Week., Disp: 9 mL, Rfl: 3    Medicines reviewed by Kang Byrd RPH on 3/31/2025 at 11:22 AM    Drug Interactions  No Clinically Significant DDIs Were Identified at Present Time Upon Marking Medications Reviewed      Recommended Medications Assessment  Aspirin: Not Taking Currently  Statin: Currently Taking   ACEi/ARB: Currently Taking     Adverse Drug Reactions  Medication tolerability: Tolerating with no to minimal ADRs  Medication plan: Continue therapy with normal follow-up  Plan for ADR Management: No adverse drug reactions reported.     Adherence, Self-Administration, and Current Therapy Problems  Adherence related to the patient's specialty therapy was discussed with the patient. The Adherence segment of this outreach has been reviewed and updated.     Adherence Questions  Linked Medication(s) Assessed: Dapagliflozin Propanediol, Semaglutide (Ozempic (2 MG/DOSE))  On average, how many doses/injections does the patient miss per month?: 0  What are the identified reasons for non-adherence or missed doses? : no problems identified  What is the estimated medication adherence level?: %  Based on the patient/caregiver response and refill history, does this patient require an MTP to track adherence improvements?: no    Additional Barriers to Patient Self-Administration: None  Methods for Supporting Patient Self-Administration: n/a    Open Medication Therapy Problems  No medication therapy recommendations to display    Goals of  Therapy  Goals related to the patient's specialty therapy were discussed with the patient. The Patient Goals segment of this outreach has been reviewed and updated.   Goals Addressed Today        Specialty Pharmacy General Goal      A1C < 7 %     7/24/2023:  A1c has decreased to 6.6% from 7.5% during previous visit.  Goal completed.  Will now focus on maintaining A1c <7%.  CR    10/24/2023:  A1C steady at 6.6%, on track today with no changes to therapy at this time. KL     3/18/2024:  Pt maintaining A1c WNL (at goal), decreased slightly from 6.6% to 6.5% during today's visit.  No changes at this time, will continue to follow.  CR    9/4/2024: Phone reassessment, will follow up at next appointment 10/8. MS    10/9/2024: A1c up to 7.7% . Plan to increase Ozempic to 2 mg today. Patient has previously trialed Ozempic at this dose and had terrible nausea. Plan to follow up next week after his first 2 mg dose and determine if tolerable. If unable to tolerate, consider transition to Mounjaro or Trulicity. MS    03/31/2025: Virtual:  Patient tolerating medication well  with no side effects.  Will fanny on track and follow up at next appointment. RS              Quality of Life Assessment   Quality of Life related to the patient's enrollment in the patient management program and services provided was discussed with the patient. The QOL segment of this outreach has been reviewed and updated.  Quality of Life Improvement Scale: 8-Moderately better    Reassessment Plan & Follow-Up  1. Medication Therapy Changes: No changes  2. Related Plans, Therapy Recommendations, or Issues to Be Addressed: Will follow up with patient at next appointment.   3. Pharmacist to perform regular assessments no more than (6) months from the previous assessment.  4. Care Coordinator to set up future refill outreaches, coordinate prescription delivery, and escalate clinical questions to pharmacist.    Attestation  Therapeutic appropriateness:  Appropriate   I attest the patient was actively involved in and has agreed to the above plan of care.  If the prescribed therapy is at any point deemed not appropriate based on the current or future assessments, a consultation will be initiated with the patient's specialty care provider to determine the best course of action. The revised plan of therapy will be documented along with any required assessments and/or additional patient education provided.     Jamie Byrd, PharmD  Clinical Specialty Pharmacist, Endocrinology  3/31/2025  11:24 EDT

## 2025-04-11 ENCOUNTER — SPECIALTY PHARMACY (OUTPATIENT)
Dept: GENERAL RADIOLOGY | Facility: HOSPITAL | Age: 45
End: 2025-04-11
Payer: COMMERCIAL

## 2025-04-11 RX ORDER — DAPAGLIFLOZIN 10 MG/1
10 TABLET, FILM COATED ORAL DAILY
Qty: 90 TABLET | Refills: 1 | Status: SHIPPED | OUTPATIENT
Start: 2025-04-11

## 2025-04-11 NOTE — PROGRESS NOTES
Specialty Pharmacy Patient Management Program  Per Protocol Prescription Order/Refill     Patient currently fills medications at [facility] and is enrolled in an Endocrinology Patient Management Program.     Requested Prescriptions     Pending Prescriptions Disp Refills    dapagliflozin Propanediol (Farxiga) 10 MG tablet 90 tablet 1     Sig: Take 1 tablet by mouth once daily.     Prescription orders above were sent to the pharmacy per Collaborative Care Agreement Protocol.     Tone Seymour, PharmD  Clinical Specialty Pharmacist, Endocrinology  4/11/2025  07:46 EDT

## 2025-04-17 ENCOUNTER — SPECIALTY PHARMACY (OUTPATIENT)
Dept: ENDOCRINOLOGY | Facility: CLINIC | Age: 45
End: 2025-04-17
Payer: COMMERCIAL

## 2025-04-17 NOTE — PROGRESS NOTES
"   Specialty Pharmacy Patient Management Program  Refill Outreach     George \"Nicola\" was contacted today regarding refills of their medication(s). Farxiga and Ozempic.     Refill Questions      Flowsheet Row Most Recent Value   Changes to allergies? No   Changes to medications? No   New conditions or infections since last clinic visit No   Unplanned office visit, urgent care, ED, or hospital admission in the last 4 weeks  No   How does patient/caregiver feel medication is working? Good   Financial problems or insurance changes  No   Since the previous refill, were any specialty medication doses or scheduled injections missed or delayed?  No   Does this patient require a clinical escalation to a pharmacist? No            Delivery Questions      Flowsheet Row Most Recent Value   Delivery method UPS   Delivery address verified with patient/caregiver? Yes   Delivery address Home   Number of medications in delivery 6   Medication(s) being filled and delivered Dapagliflozin Propanediol, Glimepiride (AMARYL), Lisinopril (PRINIVIL,ZESTRIL), metFORMIN HCl (GLUCOPHAGE-XR), Semaglutide (Ozempic (2 MG/DOSE)), Rosuvastatin Calcium (CRESTOR)   Doses left of specialty medications 1   Copay verified? Yes   Copay amount $54.22   Copay form of payment Credit/debit on file   Delivery Date Selection 04/18/25   Signature Required No   Do you consent to receive electronic handouts?  Yes            Medication Adherence    Adherence tools used: patient uses a pill box to manage medications  Support network for adherence: healthcare provider          Follow-up: 28 day(s)     Brandee López, Pharmacy Technician  4/17/2025  08:43 EDT    "

## 2025-05-14 ENCOUNTER — SPECIALTY PHARMACY (OUTPATIENT)
Dept: ENDOCRINOLOGY | Facility: CLINIC | Age: 45
End: 2025-05-14
Payer: COMMERCIAL

## 2025-05-14 NOTE — PROGRESS NOTES
"   Specialty Pharmacy Patient Management Program  Refill Outreach     George \"Nicola\" was contacted today regarding refills of their medication(s). Ozempic and Farxiga     Refill Questions      Flowsheet Row Most Recent Value   Changes to allergies? No   Changes to medications? No   New conditions or infections since last clinic visit No   Unplanned office visit, urgent care, ED, or hospital admission in the last 4 weeks  No   How does patient/caregiver feel medication is working? Good   Financial problems or insurance changes  No   Since the previous refill, were any specialty medication doses or scheduled injections missed or delayed?  No   Does this patient require a clinical escalation to a pharmacist? No            Delivery Questions      Flowsheet Row Most Recent Value   Delivery method UPS   Delivery address verified with patient/caregiver? Yes   Delivery address Home   Number of medications in delivery 6   Medication(s) being filled and delivered Semaglutide (Ozempic (2 MG/DOSE)), Lisinopril (PRINIVIL,ZESTRIL), metFORMIN HCl (GLUCOPHAGE-XR), Rosuvastatin Calcium (CRESTOR), Glimepiride (AMARYL), Dapagliflozin Propanediol   Doses left of specialty medications 1   Copay verified? Yes   Copay amount $53.19   Copay form of payment Credit/debit on file   Delivery Date Selection 05/15/25   Signature Required No   Do you consent to receive electronic handouts?  Yes            Medication Adherence    Adherence tools used: patient uses a pill box to manage medications  Support network for adherence: healthcare provider          Follow-up: 28 day(s)     Brandee López, Pharmacy Technician  5/14/2025  08:57 EDT    "

## 2025-06-09 ENCOUNTER — SPECIALTY PHARMACY (OUTPATIENT)
Dept: ENDOCRINOLOGY | Facility: CLINIC | Age: 45
End: 2025-06-09
Payer: COMMERCIAL

## 2025-06-09 NOTE — PROGRESS NOTES
"   Specialty Pharmacy Patient Management Program  Refill Outreach     George \"Nicola\" was contacted today regarding refills of their medication(s). Farxiga and Ozempic    Refill Questions      Flowsheet Row Most Recent Value   Changes to allergies? No   Changes to medications? No   New conditions or infections since last clinic visit No   Unplanned office visit, urgent care, ED, or hospital admission in the last 4 weeks  No   How does patient/caregiver feel medication is working? Good   Financial problems or insurance changes  No   Since the previous refill, were any specialty medication doses or scheduled injections missed or delayed?  No   Does this patient require a clinical escalation to a pharmacist? No            Delivery Questions      Flowsheet Row Most Recent Value   Delivery method UPS   Delivery address verified with patient/caregiver? Yes   Delivery address Home   Number of medications in delivery 5   Medication(s) being filled and delivered Semaglutide (Ozempic (2 MG/DOSE)), Lisinopril (PRINIVIL,ZESTRIL), Rosuvastatin Calcium (CRESTOR), Glimepiride (AMARYL), Dapagliflozin Propanediol   Doses left of specialty medications 1   Copay verified? Yes   Copay amount $45.85   Copay form of payment Credit/debit on file   Delivery Date Selection 06/11/25   Signature Required No   Do you consent to receive electronic handouts?  Yes            Medication Adherence    Adherence tools used: patient uses a pill box to manage medications  Support network for adherence: healthcare provider          Follow-up: 28 day(s)     Brandee López, Pharmacy Technician  6/9/2025  15:09 EDT    "

## 2025-07-08 ENCOUNTER — SPECIALTY PHARMACY (OUTPATIENT)
Age: 45
End: 2025-07-08
Payer: COMMERCIAL

## 2025-07-08 NOTE — PROGRESS NOTES
" Specialty Pharmacy Patient Management Program  Endocrinology Refill Outreach      George \"Maggie" is a 45 y.o. male contacted today regarding refills of his medication(s).    Specialty medication(s) and dose(s) confirmed: Farxiga, Ozempic.    Refill Questions      Flowsheet Row Most Recent Value   Changes to allergies? No   Changes to medications? No   New conditions or infections since last clinic visit No   Unplanned office visit, urgent care, ED, or hospital admission in the last 4 weeks  No   How does patient/caregiver feel medication is working? Very good   Financial problems or insurance changes  No   Since the previous refill, were any specialty medication doses or scheduled injections missed or delayed?  No   Does this patient require a clinical escalation to a pharmacist? No          Delivery Questions      Flowsheet Row Most Recent Value   Delivery method UPS   Delivery address verified with patient/caregiver? Yes   Delivery address Home   Number of medications in delivery 5   Medication(s) being filled and delivered Semaglutide (Ozempic (2 MG/DOSE)), Rosuvastatin Calcium (CRESTOR), Glimepiride (AMARYL), Lisinopril (PRINIVIL,ZESTRIL), Dapagliflozin Propanediol   Doses left of specialty medications 1   Copay verified? Yes   Copay amount $45.85   Copay form of payment Credit/debit on file   Delivery Date Selection 07/09/25   Signature Required No   Do you consent to receive electronic handouts?  Yes            Follow-Up: 28d    Darron Villatoro CPhT  Pharmacy Care Coordinator, Endocrinology  7/8/2025  09:55 EDT                    "

## 2025-08-05 ENCOUNTER — SPECIALTY PHARMACY (OUTPATIENT)
Dept: ENDOCRINOLOGY | Facility: CLINIC | Age: 45
End: 2025-08-05
Payer: COMMERCIAL

## 2025-08-21 ENCOUNTER — RESULTS FOLLOW-UP (OUTPATIENT)
Dept: ENDOCRINOLOGY | Facility: CLINIC | Age: 45
End: 2025-08-21

## 2025-08-21 ENCOUNTER — SPECIALTY PHARMACY (OUTPATIENT)
Dept: ENDOCRINOLOGY | Facility: CLINIC | Age: 45
End: 2025-08-21
Payer: COMMERCIAL

## 2025-08-21 ENCOUNTER — OFFICE VISIT (OUTPATIENT)
Dept: ENDOCRINOLOGY | Facility: CLINIC | Age: 45
End: 2025-08-21
Payer: COMMERCIAL

## 2025-08-21 VITALS
OXYGEN SATURATION: 100 % | HEIGHT: 74 IN | HEART RATE: 72 BPM | BODY MASS INDEX: 28.23 KG/M2 | DIASTOLIC BLOOD PRESSURE: 60 MMHG | SYSTOLIC BLOOD PRESSURE: 120 MMHG | WEIGHT: 220 LBS

## 2025-08-21 DIAGNOSIS — R80.9 TYPE 2 DIABETES MELLITUS WITH MICROALBUMINURIA, WITHOUT LONG-TERM CURRENT USE OF INSULIN: ICD-10-CM

## 2025-08-21 DIAGNOSIS — E11.65 TYPE 2 DIABETES MELLITUS WITH HYPERGLYCEMIA, WITHOUT LONG-TERM CURRENT USE OF INSULIN: Primary | ICD-10-CM

## 2025-08-21 DIAGNOSIS — E78.2 MIXED HYPERLIPIDEMIA: ICD-10-CM

## 2025-08-21 DIAGNOSIS — E11.29 TYPE 2 DIABETES MELLITUS WITH MICROALBUMINURIA, WITHOUT LONG-TERM CURRENT USE OF INSULIN: ICD-10-CM

## 2025-08-21 LAB
ALBUMIN SERPL-MCNC: 4.7 G/DL (ref 3.5–5.2)
ALBUMIN UR-MCNC: 42 MG/DL
ALBUMIN/GLOB SERPL: 1.4 G/DL
ALP SERPL-CCNC: 52 U/L (ref 39–117)
ALT SERPL W P-5'-P-CCNC: 45 U/L (ref 1–41)
ANION GAP SERPL CALCULATED.3IONS-SCNC: 14 MMOL/L (ref 5–15)
AST SERPL-CCNC: 33 U/L (ref 1–40)
BILIRUB SERPL-MCNC: 0.6 MG/DL (ref 0–1.2)
BUN SERPL-MCNC: 15 MG/DL (ref 6–20)
BUN/CREAT SERPL: 18.5 (ref 7–25)
CALCIUM SPEC-SCNC: 9.7 MG/DL (ref 8.6–10.5)
CHLORIDE SERPL-SCNC: 102 MMOL/L (ref 98–107)
CHOLEST SERPL-MCNC: 131 MG/DL (ref 0–200)
CO2 SERPL-SCNC: 24 MMOL/L (ref 22–29)
CREAT SERPL-MCNC: 0.81 MG/DL (ref 0.76–1.27)
CREAT UR-MCNC: 68.2 MG/DL
EGFRCR SERPLBLD CKD-EPI 2021: 112.2 ML/MIN/1.73
EXPIRATION DATE: ABNORMAL
EXPIRATION DATE: ABNORMAL
GLOBULIN UR ELPH-MCNC: 3.4 GM/DL
GLUCOSE BLDC GLUCOMTR-MCNC: 136 MG/DL (ref 70–130)
GLUCOSE SERPL-MCNC: 121 MG/DL (ref 65–99)
HBA1C MFR BLD: 7.6 % (ref 4.5–5.7)
HDLC SERPL-MCNC: 35 MG/DL (ref 40–60)
LDLC SERPL CALC-MCNC: 73 MG/DL (ref 0–100)
LDLC/HDLC SERPL: 2 {RATIO}
Lab: ABNORMAL
Lab: ABNORMAL
MICROALBUMIN/CREAT UR: 615.8 MG/G (ref 0–29)
POTASSIUM SERPL-SCNC: 3.8 MMOL/L (ref 3.5–5.2)
PROT SERPL-MCNC: 8.1 G/DL (ref 6–8.5)
SODIUM SERPL-SCNC: 140 MMOL/L (ref 136–145)
TRIGL SERPL-MCNC: 130 MG/DL (ref 0–150)
TSH SERPL DL<=0.05 MIU/L-ACNC: 1 UIU/ML (ref 0.27–4.2)
VLDLC SERPL-MCNC: 23 MG/DL (ref 5–40)